# Patient Record
Sex: FEMALE | Race: WHITE | NOT HISPANIC OR LATINO | Employment: OTHER | URBAN - METROPOLITAN AREA
[De-identification: names, ages, dates, MRNs, and addresses within clinical notes are randomized per-mention and may not be internally consistent; named-entity substitution may affect disease eponyms.]

---

## 2017-01-04 ENCOUNTER — ANTICOAGULATION MONITORING (OUTPATIENT)
Dept: VASCULAR LAB | Facility: MEDICAL CENTER | Age: 62
End: 2017-01-04
Attending: NURSE PRACTITIONER
Payer: MEDICARE

## 2017-01-04 VITALS
DIASTOLIC BLOOD PRESSURE: 54 MMHG | BODY MASS INDEX: 22.67 KG/M2 | HEART RATE: 66 BPM | SYSTOLIC BLOOD PRESSURE: 97 MMHG | WEIGHT: 136.24 LBS

## 2017-01-04 DIAGNOSIS — I26.99 PULMONARY EMBOLISM, OTHER: ICD-10-CM

## 2017-01-04 LAB
INR BLD: 1.9 (ref 0.9–1.2)
INR PPP: 1.9 (ref 2–3.5)

## 2017-01-04 PROCEDURE — 99212 OFFICE O/P EST SF 10 MIN: CPT | Performed by: NURSE PRACTITIONER

## 2017-01-04 PROCEDURE — 85610 PROTHROMBIN TIME: CPT

## 2017-01-04 NOTE — MR AVS SNAPSHOT
Annamaria Jorgensen   2017 1:00 PM   Anticoagulation Monitoring   MRN: [id number]    Department:  Vascular Medicine   Dept Phone:  [phone]    Description:  Female : 1955   Provider:  [facility] EXAM 4           Allergies as of 2017     Allergen Noted Reactions    Erythromycin 2011       Levaquin 2010       Tape 2008       rash      Vital Signs     Smoking Status                   Never Smoker            Basic Information     Date Of Birth Sex Race Ethnicity Preferred Language    1955 Female White Non- English      Your appointments     2017  1:30 PM   Established Patient with [facility] EXAM 4   [facility]  (--)    [address] [facility] [id number] [phone]              Problem List              ICD-10-CM Priority Class Noted - Resolved    Morbid obesity (HCC) E66.01   2012 - Present    Obesity E66.9   2012 - Present    ASTHMA    2012 - Present    Anxiety F41.9   2012 - Present    Vertigo R42   2012 - Present    Pain disorder with psychological component F45.41   2012 - Present    Pulmonary embolism (HCC) I26.99   2015 - Present      Health Maintenance        Date Due Completion Dates    IMM PNEUMOCOCCAL 19-64 (ADULT) MEDIUM RISK SERIES (1 of 1 - PPSV23) 3/6/1972 ---    PAP SMEAR 3/6/1974 ---    COLONOSCOPY 3/6/2003 ---    MAMMOGRAM 2016, 2007, 2007, 2004    IMM DTaP/Tdap/Td Vaccine (2 - Td) 2026            Results     POCT Protime      Component    INR    1.9                        Current Immunizations     Influenza Vaccine Quad Inj (Pf) 10/12/2016    SHINGLES VACCINE 10/12/2016    Tdap Vaccine 2016      Below and/or attached are the medications your provider expects you to take. Review all of your home medications and newly ordered medications with your provider and/or pharmacist. Follow medication instructions as  directed by your provider and/or pharmacist. Please keep your medication list with you and share with your provider. Update the information when medications are discontinued, doses are changed, or new medications (including over-the-counter products) are added; and carry medication information at all times in the event of emergency situations     Allergies:  ERYTHROMYCIN - (reactions not documented)     LEVAQUIN - (reactions not documented)     TAPE - (reactions not documented)               Medications  Valid as of: January 04, 2017 -  1:32 PM    Generic Name Brand Name Tablet Size Instructions for use    BuPROPion HCl (TABLET SR 24 HR) WELLBUTRIN  MG Take 300 mg by mouth every morning.        Cholecalciferol (Tab) vitamin D3 (cholecalciferol) 1000 UNIT Take  by mouth every day.          ClonazePAM (Tab) KLONOPIN 1 MG As needed take at night        Coenzyme Q10 (Cap) CoQ10 100 MG Takes 200mg daily        DiazePAM (Tab) VALIUM 5 MG Take 5 mg by mouth every 6 hours as needed for Anxiety.        Hydrocodone-Acetaminophen (Tab) NORCO 5-325 MG Take 1 Tab by mouth every 6 hours as needed.        Krill Oil (Cap) Krill Oil 300 MG Take  by mouth every day.          TraMADol HCl (Tab) ULTRAM 50 MG 50 mg. Every 6 hours as needed for moderate pain        Warfarin Sodium (Tab) COUMADIN 2 MG Take one to one and one-half (1- 1.5) tablets daily as directed by coumadin clinic        .                 Medicines prescribed today were sent to:     [facility] #[id number] - [place] - [address]    [address] [address]    Phone: [phone] Fax: [phone]    Open 24 Hours?: No    [facility] - [place] - [address] AT [facility] [address]    [address] [address]    Phone: [phone] Fax: [phone]    Open 24 Hours?: No      Medication refill instructions:       If your prescription bottle indicates you have medication refills left, it is not necessary to call your  provider’s office. Please contact your pharmacy and they will refill your medication.    If your prescription bottle indicates you do not have any refills left, you may request refills at any time through one of the following ways: The online Collisionable system (except Urgent Care), by calling your provider’s office, or by asking your pharmacy to contact your provider’s office with a refill request. Medication refills are processed only during regular business hours and may not be available until the next business day. Your provider may request additional information or to have a follow-up visit with you prior to refilling your medication.   *Please Note: Medication refills are assigned a new Rx number when refilled electronically. Your pharmacy may indicate that no refills were authorized even though a new prescription for the same medication is available at the pharmacy. Please request the medicine by name with the pharmacy before contacting your provider for a refill.        Warfarin Dosing Calendar   January 2017 Details    Sun Mon Tue Wed Thu Fri Sat     1               2               3               4   1.9   3 mg   See details      5      3 mg         6      3 mg         7      3 mg           8      2 mg         9      3 mg         10      3 mg         11      2 mg         12      3 mg         13      3 mg         14      3 mg           15      2 mg         16      3 mg         17      3 mg         18      2 mg         19      3 mg         20      3 mg         21      3 mg           22      2 mg         23      3 mg         24      3 mg         25      2 mg         26      3 mg         27      3 mg         28      3 mg           29      2 mg         30      3 mg         31      3 mg              Date Details   01/04 This INR check   INR: 1.9               How to take your warfarin dose     To take:  2 mg Take 1 of the 2 mg tablets.    To take:  3 mg Take 1.5 of the 2 mg tablets.           Warfarin Dosing  Calendar February 2017 Details    Sun Mon Tue Wed Thu Fri Sat        1      2 mg         2      3 mg         3      3 mg         4      3 mg           5      2 mg         6      3 mg         7      3 mg         8      2 mg         9               10               11                 12               13               14               15               16               17               18                 19               20               21               22               23               24               25                 26               27               28                    Date Details   No additional details    Date of next INR:  2/8/2017         How to take your warfarin dose     To take:  2 mg Take 1 of the 2 mg tablets.    To take:  3 mg Take 1.5 of the 2 mg tablets.              Filmmortal Access Code: [email or web address]  Expires: 1/17/2017  8:19 AM    Filmmortal  A secure, online tool to manage your health information     [employer]’s Filmmortal® is a secure, online tool that connects you to your personalized health information from the privacy of your home -- day or night - making it very easy for you to manage your healthcare. Once the activation process is completed, you can even access your medical information using the Filmmortal marcellus, which is available for free in the Apple Marcellus store or Google Play store.     Filmmortal provides the following levels of access (as shown below):   My Chart Features   [facility] Doctor [facility]  Specialists [facility]  Urgent  Care [facility]  Primary Care  Doctor   Email your healthcare team securely and privately 24/7 X X X    Manage appointments: schedule your next appointment; view details of past/upcoming appointments X      Request prescription refills. X      View recent personal medical records, including lab and immunizations X X X X   View health record, including health history, allergies, medications X X X X   Read reports about your outpatient visits, procedures,  consult and ER notes X X X X   See your discharge summary, which is a recap of your hospital and/or ER visit that includes your diagnosis, lab results, and care plan. X X       How to register for [employer]:  1. Go to  [email or web address].  2. Click on the Sign Up Now box, which takes you to the New Member Sign Up page. You will need to provide the following information:  a. Enter your [employer]ks Access Code exactly as it appears at the top of this page. (You will not need to use this code after you’ve completed the sign-up process. If you do not sign up before the expiration date, you must request a new code.)   b. Enter your date of birth.   c. Enter your home email address.   d. Click Submit, and follow the next screen’s instructions.  3. Create a A[employer] Complete Home Servicest ID. This will be your [employer]ic[employer] login ID and cannot be changed, so think of one that is secure and easy to remember.  4. Create a A[employer] Complete [employer]icest password. You can change your password at any time.  5. Enter your Password Reset Question and Answer. This can be used at a later time if you forget your password.   6. Enter your e-mail address. This allows you to receive e-mail notifications when new information is available in [employer]ks.  7. Click Sign Up. You can now view your health information.    For assistance activating your [employer] account, call [phone]

## 2017-01-04 NOTE — PROGRESS NOTES
Anticoagulation Summary as of 1/4/2017     INR goal 2.0-3.0   Selected INR 1.9! (1/4/2017)   Maintenance plan 2 mg (2 mg x 1) on Sun, Wed; 3 mg (2 mg x 1.5) all other days   Weekly total 19 mg   Weekly max dose 20 mg   Plan last modified SHANNAN Garcia (5/4/2016)   Next INR check 2/8/2017   Target end date Indefinite    Indications   Pulmonary embolism (HCC) [I26.99]         Anticoagulation Episode Summary     [facility]    Preferred lab     Send INR reminders to     Comments *she has a non-[facility] PCP fax [phone]*      Anticoagulation Care Providers     Provider Role Specialty Phone number    Aron Patel M.D. Referring Internal Medicine [phone]    [facility] Responsible  [phone]        Patient is subtherapeutic today. She ate more greens this week but will return to her normal diet. Denies any medication changes. No current symptoms of bleeding or thrombosis reported. Last VTE in 2010. Will increase tonight then continue current regimen. Follow up in 5 weeks per pt's preference.    Next Appointment: Wednesday, February 8, 2017 at 1:00 pm.      Linda MISTRY

## 2017-01-27 ENCOUNTER — HOSPITAL ENCOUNTER (OUTPATIENT)
Dept: RADIOLOGY | Facility: MEDICAL CENTER | Age: 62
End: 2017-01-27
Attending: ORTHOPAEDIC SURGERY
Payer: MEDICARE

## 2017-01-27 DIAGNOSIS — M25.511 RIGHT SHOULDER PAIN, UNSPECIFIED CHRONICITY: ICD-10-CM

## 2017-01-27 PROCEDURE — 73221 MRI JOINT UPR EXTREM W/O DYE: CPT | Mod: RT

## 2017-02-01 ENCOUNTER — HOSPITAL ENCOUNTER (OUTPATIENT)
Dept: LAB | Facility: MEDICAL CENTER | Age: 62
End: 2017-02-01
Attending: PHYSICIAN ASSISTANT
Payer: MEDICARE

## 2017-02-01 LAB
CHOLEST SERPL-MCNC: 182 MG/DL (ref 100–199)
EST. AVERAGE GLUCOSE BLD GHB EST-MCNC: 111 MG/DL
HBA1C MFR BLD: 5.5 % (ref 0–5.6)
HDLC SERPL-MCNC: 81 MG/DL
LDLC SERPL CALC-MCNC: 91 MG/DL
TRIGL SERPL-MCNC: 51 MG/DL (ref 0–149)

## 2017-02-01 PROCEDURE — 36415 COLL VENOUS BLD VENIPUNCTURE: CPT

## 2017-02-01 PROCEDURE — 83036 HEMOGLOBIN GLYCOSYLATED A1C: CPT

## 2017-02-01 PROCEDURE — 80061 LIPID PANEL: CPT

## 2017-03-01 ENCOUNTER — ANTICOAGULATION VISIT (OUTPATIENT)
Dept: VASCULAR LAB | Facility: MEDICAL CENTER | Age: 62
End: 2017-03-01
Attending: INTERNAL MEDICINE
Payer: MEDICARE

## 2017-03-01 VITALS — HEART RATE: 76 BPM | SYSTOLIC BLOOD PRESSURE: 109 MMHG | DIASTOLIC BLOOD PRESSURE: 58 MMHG

## 2017-03-01 DIAGNOSIS — I27.82 CHRONIC SEPTIC PULMONARY EMBOLISM WITH ACUTE COR PULMONALE (HCC): ICD-10-CM

## 2017-03-01 DIAGNOSIS — I26.01 CHRONIC SEPTIC PULMONARY EMBOLISM WITH ACUTE COR PULMONALE (HCC): ICD-10-CM

## 2017-03-01 LAB
INR BLD: 2.7 (ref 0.9–1.2)
INR PPP: 2.7 (ref 2–3.5)

## 2017-03-01 PROCEDURE — 85610 PROTHROMBIN TIME: CPT

## 2017-03-01 PROCEDURE — 99211 OFF/OP EST MAY X REQ PHY/QHP: CPT | Performed by: NURSE PRACTITIONER

## 2017-03-01 NOTE — PROGRESS NOTES
Anticoagulation Summary as of 3/1/2017     INR goal 2.0-3.0   Selected INR 2.7 (3/1/2017)   Maintenance plan 2 mg (2 mg x 1) on Sun, Wed; 3 mg (2 mg x 1.5) all other days   Weekly total 19 mg   Weekly max dose 20 mg   Plan last modified SHANNAN Garcia (5/4/2016)   Next INR check 5/3/2017   Target end date Indefinite    Indications   Pulmonary embolism (CMS-HCC) [I26.99]         Anticoagulation Episode Summary     [facility]    Preferred lab     Send INR reminders to     Comments *she has a non-[facility]own PCP fax [phone]*      Anticoagulation Care Providers     Provider Role Specialty Phone number    Aron Patel M.D. Referring Internal Medicine [phone]    [facility] Responsible  [phone]          Patient is therapeutic today. Denies any medication or diet changes. No current symptoms of bleeding or thrombosis reported. Continue current regimen. Follow up in 9 weeks.    Next Appointment: Wednesday, May 3rd at 1:30 pm.    Linda MISTRY

## 2017-03-01 NOTE — MR AVS SNAPSHOT
Annamaria Jorgensen   3/1/2017 1:15 PM   Anticoagulation Visit   MRN: [id number]    Department:  Vascular Medicine   Dept Phone:  [phone]    Description:  Female : 1955   Provider:  [facility] EXAM 4           Allergies as of 3/1/2017     Allergen Noted Reactions    Erythromycin 2011       Levaquin 2010       Tape 2008       rash      You were diagnosed with     Chronic septic pulmonary embolism with acute cor pulmonale (CMS-HCC)   [[id number]]         Vital Signs     Blood Pressure Pulse Smoking Status             109/58 mmHg 76 Never Smoker          Basic Information     Date Of Birth Sex Race Ethnicity Preferred Language    1955 Female White Non- English      Your appointments     Mar 15, 2017  1:10 PM   MA SCRN10 with [facility] MG 3   [facility] ([address])    [address]  [address]  [place] [id number] [phone]           No deodorant, powder, perfume or lotion under the arm or breast area.            May 03, 2017  1:30 PM   Established Patient with [facility] EXAM 4   [facility]  (--)    [address] [facility]  [place] [address]   [phone]              Problem List              ICD-10-CM Priority Class Noted - Resolved    Morbid obesity (CMS-HCC) E66.01   2012 - Present    Obesity E66.9   2012 - Present    ASTHMA    2012 - Present    Anxiety F41.9   2012 - Present    Vertigo R42   2012 - Present    Pain disorder with psychological component F45.41   2012 - Present    Pulmonary embolism (CMS-HCC) I26.99   2015 - Present      Health Maintenance        Date Due Completion Dates    IMM PNEUMOCOCCAL 19-64 (ADULT) MEDIUM RISK SERIES (1  - PPSV23) 3/6/1972 ---    PAP SMEAR 3/6/1974 ---    COLONOSCOPY 3/6/2003 ---    MAMMOGRAM 2016, 2007, 2007, 2004    IMM DTaP/Tdap/Td Vaccine (2 - Td) 2026            Results     POCT  Protime      Component    INR    2.7                        Current Immunizations     Influenza Vaccine Quad Inj (Pf) 10/12/2016    SHINGLES VACCINE 10/12/2016    Tdap Vaccine 5/25/2016      Below and/or attached are the medications your provider expects you to take. Review all of your home medications and newly ordered medications with your provider and/or pharmacist. Follow medication instructions as directed by your provider and/or pharmacist. Please keep your medication list with you and share with your provider. Update the information when medications are discontinued, doses are changed, or new medications (including over-the-counter products) are added; and carry medication information at all times in the event of emergency situations     Allergies:  ERYTHROMYCIN - (reactions not documented)     LEVAQUIN - (reactions not documented)     TAPE - (reactions not documented)               Medications  Valid as of: March 01, 2017 -  1:22 PM    Generic Name Brand Name Tablet Size Instructions for use    BuPROPion HCl (TABLET SR 24 HR) WELLBUTRIN  MG Take 300 mg by mouth every morning.        Cholecalciferol (Tab) vitamin D3 (cholecalciferol) 1000 UNIT Take  by mouth every day.          ClonazePAM (Tab) KLONOPIN 1 MG As needed take at night        Coenzyme Q10 (Cap) CoQ10 100 MG Takes 200mg daily        DiazePAM (Tab) VALIUM 5 MG Take 5 mg by mouth every 6 hours as needed for Anxiety.        Hydrocodone-Acetaminophen (Tab) NORCO 5-325 MG Take 1 Tab by mouth every 6 hours as needed.        Krill Oil (Cap) Krill Oil 300 MG Take  by mouth every day.          TraMADol HCl (Tab) ULTRAM 50 MG 50 mg. Every 6 hours as needed for moderate pain        Warfarin Sodium (Tab) COUMADIN 2 MG Take one to one and one-half (1- 1.5) tablets daily as directed by coumadin clinic        .                 Medicines prescribed today were sent to:     [facility][id number] - [place] - [address]    [address] [place] [address]    Phone:  [phone] Fax: [phone]    Open 24 Hours?: No    [facility] - [place], [place] - [address] AT [facility] [address]    [address] [address]    Phone: [phone] Fax: [phone]    Open 24 Hours?: No      Medication refill instructions:       If your prescription bottle indicates you have medication refills left, it is not necessary to call your provider’s office. Please contact your pharmacy and they will refill your medication.    If your prescription bottle indicates you do not have any refills left, you may request refills at any time through one of the following ways: The online Maternova system (except Urgent Care), by calling your provider’s office, or by asking your pharmacy to contact your provider’s office with a refill request. Medication refills are processed only during regular business hours and may not be available until the next business day. Your provider may request additional information or to have a follow-up visit with you prior to refilling your medication.   *Please Note: Medication refills are assigned a new Rx number when refilled electronically. Your pharmacy may indicate that no refills were authorized even though a new prescription for the same medication is available at the pharmacy. Please request the medicine by name with the pharmacy before contacting your provider for a refill.        Warfarin Dosing Calendar   March 2017 Details    Sun Mon Tue Wed Thu Fri Sat        1   2.7   2 mg   See details      2      3 mg         3      3 mg         4      3 mg           5      2 mg         6      3 mg         7      3 mg         8      2 mg         9      3 mg         10      3 mg         11      3 mg           12      2 mg         13      3 mg         14      3 mg         15      2 mg         16      3 mg         17      3 mg         18      3 mg           19      2 mg         20      3 mg         21      3 mg         22      2 mg          23      3 mg         24      3 mg         25      3 mg           26      2 mg         27      3 mg         28      3 mg         29      2 mg         30      3 mg         31      3 mg           Date Details   03/01 This INR check   INR: 2.7               How to take your warfarin dose     To take:  2 mg Take 1 of the 2 mg tablets.    To take:  3 mg Take 1.5 of the 2 mg tablets.           Warfarin Dosing Calendar   April 2017 Details    Sun Mon Tue Wed Thu Fri Sat           1      3 mg           2      2 mg         3      3 mg         4      3 mg         5      2 mg         6      3 mg         7      3 mg         8      3 mg           9      2 mg         10      3 mg         11      3 mg         12      2 mg         13      3 mg         14      3 mg         15      3 mg           16      2 mg         17      3 mg         18      3 mg         19      2 mg         20      3 mg         21      3 mg         22      3 mg           23      2 mg         24      3 mg         25      3 mg         26      2 mg         27      3 mg         28      3 mg         29      3 mg           30      2 mg                Date Details   No additional details            How to take your warfarin dose     To take:  2 mg Take 1 of the 2 mg tablets.    To take:  3 mg Take 1.5 of the 2 mg tablets.           Warfarin Dosing Calendar   May 2017 Details    Sun Mon Tue Wed Thu Fri Sat      1      3 mg         2      3 mg         3      2 mg         4               5               6                 7               8               9               10               11               12               13                 14               15               16               17               18               19               20                 21               22               23               24               25               26               27                 28               29               30               31                   Date Details   No additional  details    Date of next INR:  5/3/2017         How to take your warfarin dose     To take:  2 mg Take 1 of the 2 mg tablets.    To take:  3 mg Take 1.5 of the 2 mg tablets.              PolicyGenius Access Code: [email or web address]  Expires: 3/13/2017  9:51 AM    PolicyGenius  A secure, online tool to manage your health information     [employer] PolicyGenius® is a secure, online tool that connects you to your personalized health information from the privacy of your home -- day or night - making it very easy for you to manage your healthcare. Once the activation process is completed, you can even access your medical information using the PolicyGenius marcellus, which is available for free in the Apple Marcellus store or Google Play store.     PolicyGenius provides the following levels of access (as shown below):   My Chart Features   [facility] Primary Care Doctor [facility]  Specialists [facility]  Urgent  Care [facility]  Primary Care  Doctor   Email your healthcare team securely and privately 24/7 X X X    Manage appointments: schedule your next appointment; view details of past/upcoming appointments X      Request prescription refills. X      View recent personal medical records, including lab and immunizations X X X X   View health record, including health history, allergies, medications X X X X   Read reports about your outpatient visits, procedures, consult and ER notes X X X X   See your discharge summary, which is a recap of your hospital and/or ER visit that includes your diagnosis, lab results, and care plan. X X       How to register for PolicyGenius:  1. Go to  [email or web address].  2. Click on the Sign Up Now box, which takes you to the New Member Sign Up page. You will need to provide the following information:  a. Enter your PolicyGenius Access Code exactly as it appears at the top of this page. (You will not need to use this code after you’ve completed the sign-up process. If you do not sign up before the expiration date, you must request a new  code.)   b. Enter your date of birth.   c. Enter your home email address.   d. Click Submit, and follow the next screen’s instructions.  3. Create a Self Point ID. This will be your Self Point login ID and cannot be changed, so think of one that is secure and easy to remember.  4. Create a [employer] password. You can change your password at any time.  5. Enter your Password Reset Question and Answer. This can be used at a later time if you forget your password.   6. Enter your e-mail address. This allows you to receive e-mail notifications when new information is available in Self Point.  7. Click Sign Up. You can now view your health information.    For assistance activating your Self Point account, call [phone]

## 2017-04-07 ENCOUNTER — HOSPITAL ENCOUNTER (OUTPATIENT)
Dept: CARDIOLOGY | Facility: MEDICAL CENTER | Age: 62
End: 2017-04-07
Attending: INTERNAL MEDICINE
Payer: MEDICARE

## 2017-04-07 DIAGNOSIS — R01.1 CARDIAC MURMUR: ICD-10-CM

## 2017-04-07 LAB — LV EJECT FRACT  99904: 60

## 2017-04-07 PROCEDURE — 93306 TTE W/DOPPLER COMPLETE: CPT

## 2017-04-07 PROCEDURE — 93306 TTE W/DOPPLER COMPLETE: CPT | Mod: 26 | Performed by: INTERNAL MEDICINE

## 2017-05-05 ENCOUNTER — ANTICOAGULATION VISIT (OUTPATIENT)
Dept: VASCULAR LAB | Facility: MEDICAL CENTER | Age: 62
End: 2017-05-05
Attending: INTERNAL MEDICINE
Payer: MEDICARE

## 2017-05-05 DIAGNOSIS — I26.99 PULMONARY EMBOLISM, OTHER: ICD-10-CM

## 2017-05-05 LAB — INR PPP: 2.1 (ref 2–3.5)

## 2017-05-05 PROCEDURE — 99211 OFF/OP EST MAY X REQ PHY/QHP: CPT | Performed by: NURSE PRACTITIONER

## 2017-05-05 PROCEDURE — 85610 PROTHROMBIN TIME: CPT

## 2017-05-05 NOTE — PROGRESS NOTES
Anticoagulation Summary as of 5/5/2017     INR goal 2.0-3.0   Selected INR 2.1 (5/5/2017)   Maintenance plan 2 mg (2 mg x 1) on Sun, Wed; 3 mg (2 mg x 1.5) all other days   Weekly total 19 mg   Weekly max dose 20 mg   Plan last modified SHANNAN Garcia (5/4/2016)   Next INR check 6/5/2017   Target end date Indefinite    Indications   Pulmonary embolism (CMS-HCC) [I26.99]         Anticoagulation Episode Summary     [facility]    Preferred lab     Send INR reminders to     Comments *she has a non-[facility] PCP fax [phone]*      Anticoagulation Care Providers     Provider Role Specialty Phone number    Aron Patel M.D. Referring Internal Medicine [phone]    [facility] Responsible  [phone]        Patient is therapeutic today. Denies any medication or diet changes. No current symptoms of bleeding or thrombosis reported. Continue current regimen. Follow up in 10 weeks.    Next Appointment: Wednesday, July 12, 2017 at  1:00 pm.    Linda MISTRY

## 2017-05-05 NOTE — MR AVS SNAPSHOT
Annamaria Jorgensen   2017 1:20 PM   Anticoagulation Visit   MRN: [id number]    Department:  Vascular Medicine   Dept Phone:  [phone]    Description:  Female : 1955   Provider:  [facility] EXAM 4           Allergies as of 2017     Allergen Noted Reactions    Erythromycin 2011       Levaquin 2010       Tape 2008       rash      You were diagnosed with     Pulmonary embolism, other   [[id number]]         Vital Signs     Smoking Status                   Never Smoker            Basic Information     Date Of Birth Sex Race Ethnicity Preferred Language    1955 Female White Non- English      Your appointments     May 09, 2017  1:30 PM   MA SCRN10 with [facility] MG 3   [facility]    [address]  [address]  [place] [id number] [phone]           No deodorant, powder, perfume or lotion under the arm or breast area.            2017  1:00 PM   Established Patient with [facility] EXAM 4   [facility]  (--)    [address]  [place] [address]   [phone]              Problem List              ICD-10-CM Priority Class Noted - Resolved    Morbid obesity (CMS-[facility]) E66.01   2012 - Present    Obesity E66.9   2012 - Present    ASTHMA    2012 - Present    Anxiety F41.9   2012 - Present    Vertigo R42   2012 - Present    Pain disorder with psychological component F45.41   2012 - Present    Pulmonary embolism (CMS-HCC) I26.99   2015 - Present      Health Maintenance        Date Due Completion Dates    IMM PNEUMOCOCCAL 19-64 (ADULT) MEDIUM RISK SERIES (1 of  - PPSV23) 3/6/1972 ---    PAP SMEAR 3/6/1974 ---    COLONOSCOPY 3/6/2003 ---    MAMMOGRAM 2016, 2007, 2007, 2004    IMM DTaP/Tdap/Td Vaccine (2 - Td) 2026            Results     POCT Protime      Component    INR    2.1                        Current  Immunizations     Influenza Vaccine Quad Inj (Pf) 10/12/2016    SHINGLES VACCINE 10/12/2016    Tdap Vaccine 5/25/2016      Below and/or attached are the medications your provider expects you to take. Review all of your home medications and newly ordered medications with your provider and/or pharmacist. Follow medication instructions as directed by your provider and/or pharmacist. Please keep your medication list with you and share with your provider. Update the information when medications are discontinued, doses are changed, or new medications (including over-the-counter products) are added; and carry medication information at all times in the event of emergency situations     Allergies:  ERYTHROMYCIN - (reactions not documented)     LEVAQUIN - (reactions not documented)     TAPE - (reactions not documented)               Medications  Valid as of: May 05, 2017 -  1:29 PM    Generic Name Brand Name Tablet Size Instructions for use    BuPROPion HCl (TABLET SR 24 HR) WELLBUTRIN  MG Take 300 mg by mouth every morning.        Cholecalciferol (Tab) vitamin D3 (cholecalciferol) 1000 UNIT Take  by mouth every day.          ClonazePAM (Tab) KLONOPIN 1 MG As needed take at night        Coenzyme Q10 (Cap) CoQ10 100 MG Takes 200mg daily        DiazePAM (Tab) VALIUM 5 MG Take 5 mg by mouth every 6 hours as needed for Anxiety.        Hydrocodone-Acetaminophen (Tab) NORCO 5-325 MG Take 1 Tab by mouth every 6 hours as needed.        Krill Oil (Cap) Krill Oil 300 MG Take  by mouth every day.          TraMADol HCl (Tab) ULTRAM 50 MG 50 mg. Every 6 hours as needed for moderate pain        Warfarin Sodium (Tab) COUMADIN 2 MG Take one to one and one-half (1- 1.5) tablets daily as directed by coumadin clinic        .                 Medicines prescribed today were sent to:     [id number] - [place] - [address] [place] [address]    Phone: [phone] Fax: [phone]    Open 24 Hours?: No    [facility] - [place], [place] - [address] AT [facility] [address]    [address] [place] [address]    Phone: [phone] Fax: [phone]    Open 24 Hours?: No      Medication refill instructions:       If your prescription bottle indicates you have medication refills left, it is not necessary to call your provider’s office. Please contact your pharmacy and they will refill your medication.    If your prescription bottle indicates you do not have any refills left, you may request refills at any time through one of the following ways: The online [employer] system (except Urgent Care), by calling your provider’s office, or by asking your pharmacy to contact your provider’s office with a refill request. Medication refills are processed only during regular business hours and may not be available until the next business day. Your provider may request additional information or to have a follow-up visit with you prior to refilling your medication.   *Please Note: Medication refills are assigned a new Rx number when refilled electronically. Your pharmacy may indicate that no refills were authorized even though a new prescription for the same medication is available at the pharmacy. Please request the medicine by name with the pharmacy before contacting your provider for a refill.        Warfarin Dosing Calendar   May 2017 Details    Sun Mon Tue Wed Thu Fri Sat      1               2               3               4               5   2.1   3 mg   See details      6      3 mg           7      2 mg         8      3 mg         9      3 mg         10      2 mg         11      3 mg         12      3 mg         13      3 mg           14      2 mg         15      3 mg         16      3 mg         17      2 mg         18      3 mg         19      3 mg         20      3 mg           21      2 mg         22      3 mg         23      3 mg         24      2 mg         25      3 mg         26      3 mg         27         3 mg           28      2 mg         29      3 mg         30      3 mg         31      2 mg             Date Details   05/05 This INR check   INR: 2.1               How to take your warfarin dose     To take:  2 mg Take 1 of the 2 mg tablets.    To take:  3 mg Take 1.5 of the 2 mg tablets.           Warfarin Dosing Calendar   June 2017 Details    Sun Mon Tue Wed Thu Fri Sat         1      3 mg         2      3 mg         3      3 mg           4      2 mg         5      3 mg         6      3 mg         7      2 mg         8      3 mg         9      3 mg         10      3 mg           11      2 mg         12      3 mg         13      3 mg         14      2 mg         15      3 mg         16      3 mg         17      3 mg           18      2 mg         19      3 mg         20      3 mg         21      2 mg         22      3 mg         23      3 mg         24      3 mg           25      2 mg         26      3 mg         27      3 mg         28      2 mg         29      3 mg         30      3 mg           Date Details   No additional details            How to take your warfarin dose     To take:  2 mg Take 1 of the 2 mg tablets.    To take:  3 mg Take 1.5 of the 2 mg tablets.           Warfarin Dosing Calendar   July 2017 Details    Sun Mon Tue Wed Thu Fri Sat           1      3 mg           2      2 mg         3      3 mg         4      3 mg         5      2 mg         6      3 mg         7      3 mg         8      3 mg           9      2 mg         10      3 mg         11      3 mg         12      2 mg         13               14               15                 16               17               18               19               20               21               22                 23               24               25               26               27               28               29                 30               31                     Date Details   No additional details    Date of next INR:  7/12/2017         How  to take your warfarin dose     To take:  2 mg Take 1 of the 2 mg tablets.    To take:  3 mg Take 1.5 of the 2 mg tablets.              THUBIT Access Code: [email or web address]  Expires: 5/10/2017  1:10 PM    THUBIT  A secure, online tool to manage your health information     [employer]’s THUBIT® is a secure, online tool that connects you to your personalized health information from the privacy of your home -- day or night - making it very easy for you to manage your healthcare. Once the activation process is completed, you can even access your medical information using the THUBIT marcellus, which is available for free in the [employer] Mar[employer] store or Google Play store.     THUBIT provides the following levels of access (as shown below):   My Chart Features   [facility] Doctor [facility]  Urgent  Care Non-[facility]  Primary Care  Doctor   Email your healthcare team securely and privately 24/7 X X X    Manage appointments: schedule your next appointment; view details of past/upcoming appointments X      Request prescription refills. X      View recent personal medical records, including lab and immunizations X X X X   View health record, including health history, allergies, medications X X X X   Read reports about your outpatient visits, procedures, consult and ER notes X X X X   See your discharge summary, which is a recap of your hospital and/or ER visit that includes your diagnosis, lab results, and care plan. X X       How to register for THUBIT:  1. Go to  [email or web address].  2. Click on the Sign Up Now box, which takes you to the New Member Sign Up page. You will need to provide the following information:  a. Enter your THUBIT Access Code exactly as it appears at the top of this page. (You will not need to use this code after you’ve completed the sign-up process. If you do not sign up before the expiration date, you must request a new code.)   b. Enter your date of birth.   c. Enter your  home email address.   d. Click Submit, and follow the next screen’s instructions.  3. Create a [employer] ID. This will be your [employer] login ID and cannot be changed, so think of one that is secure and easy to remember.  4. Create a [employer] password. You can change your password at any time.  5. Enter your Password Reset Question and Answer. This can be used at a later time if you forget your password.   6. Enter your e-mail address. This allows you to receive e-mail notifications when new information is available in [employer].  7. Click Sign Up. You can now view your health information.    For assistance activating your [employer] account, call [phone]

## 2017-05-12 LAB — INR BLD: 2.1 (ref 0.9–1.2)

## 2017-05-25 ENCOUNTER — HOSPITAL ENCOUNTER (OUTPATIENT)
Dept: LAB | Facility: MEDICAL CENTER | Age: 62
End: 2017-05-25
Attending: NURSE PRACTITIONER
Payer: MEDICARE

## 2017-05-25 LAB
25(OH)D3 SERPL-MCNC: 41 NG/ML (ref 30–100)
ALBUMIN SERPL BCP-MCNC: 4.5 G/DL (ref 3.2–4.9)
ALBUMIN/GLOB SERPL: 2 G/DL
ALP SERPL-CCNC: 85 U/L (ref 30–99)
ALT SERPL-CCNC: 51 U/L (ref 2–50)
ANION GAP SERPL CALC-SCNC: 4 MMOL/L (ref 0–11.9)
APPEARANCE UR: CLEAR
AST SERPL-CCNC: 42 U/L (ref 12–45)
BASOPHILS # BLD AUTO: 0.9 % (ref 0–1.8)
BASOPHILS # BLD: 0.04 K/UL (ref 0–0.12)
BILIRUB SERPL-MCNC: 0.5 MG/DL (ref 0.1–1.5)
BILIRUB UR QL STRIP.AUTO: NEGATIVE
BUN SERPL-MCNC: 10 MG/DL (ref 8–22)
CALCIUM SERPL-MCNC: 9.9 MG/DL (ref 8.5–10.5)
CHLORIDE SERPL-SCNC: 107 MMOL/L (ref 96–112)
CHOLEST SERPL-MCNC: 178 MG/DL (ref 100–199)
CO2 SERPL-SCNC: 31 MMOL/L (ref 20–33)
COLOR UR: COLORLESS
CREAT SERPL-MCNC: 0.77 MG/DL (ref 0.5–1.4)
CREAT UR-MCNC: 16.1 MG/DL
CULTURE IF INDICATED INDCX: NO UA CULTURE
EOSINOPHIL # BLD AUTO: 0.07 K/UL (ref 0–0.51)
EOSINOPHIL NFR BLD: 1.5 % (ref 0–6.9)
ERYTHROCYTE [DISTWIDTH] IN BLOOD BY AUTOMATED COUNT: 45.1 FL (ref 35.9–50)
GFR SERPL CREATININE-BSD FRML MDRD: >60 ML/MIN/1.73 M 2
GLOBULIN SER CALC-MCNC: 2.3 G/DL (ref 1.9–3.5)
GLUCOSE SERPL-MCNC: 83 MG/DL (ref 65–99)
GLUCOSE UR STRIP.AUTO-MCNC: NEGATIVE MG/DL
HCT VFR BLD AUTO: 41 % (ref 37–47)
HDLC SERPL-MCNC: 75 MG/DL
HGB BLD-MCNC: 12.9 G/DL (ref 12–16)
IMM GRANULOCYTES # BLD AUTO: 0.01 K/UL (ref 0–0.11)
IMM GRANULOCYTES NFR BLD AUTO: 0.2 % (ref 0–0.9)
KETONES UR STRIP.AUTO-MCNC: NEGATIVE MG/DL
LDLC SERPL CALC-MCNC: 94 MG/DL
LEUKOCYTE ESTERASE UR QL STRIP.AUTO: NEGATIVE
LYMPHOCYTES # BLD AUTO: 2.05 K/UL (ref 1–4.8)
LYMPHOCYTES NFR BLD: 44.3 % (ref 22–41)
MCH RBC QN AUTO: 29.5 PG (ref 27–33)
MCHC RBC AUTO-ENTMCNC: 31.5 G/DL (ref 33.6–35)
MCV RBC AUTO: 93.8 FL (ref 81.4–97.8)
MICRO URNS: NORMAL
MICROALBUMIN UR-MCNC: <0.7 MG/DL
MICROALBUMIN/CREAT UR: NORMAL MG/G (ref 0–30)
MONOCYTES # BLD AUTO: 0.44 K/UL (ref 0–0.85)
MONOCYTES NFR BLD AUTO: 9.5 % (ref 0–13.4)
NEUTROPHILS # BLD AUTO: 2.02 K/UL (ref 2–7.15)
NEUTROPHILS NFR BLD: 43.6 % (ref 44–72)
NITRITE UR QL STRIP.AUTO: NEGATIVE
NRBC # BLD AUTO: 0 K/UL
NRBC BLD AUTO-RTO: 0 /100 WBC
PH UR STRIP.AUTO: 7 [PH]
PLATELET # BLD AUTO: 245 K/UL (ref 164–446)
PMV BLD AUTO: 10.7 FL (ref 9–12.9)
POTASSIUM SERPL-SCNC: 4.5 MMOL/L (ref 3.6–5.5)
PROT SERPL-MCNC: 6.8 G/DL (ref 6–8.2)
PROT UR QL STRIP: NEGATIVE MG/DL
RBC # BLD AUTO: 4.37 M/UL (ref 4.2–5.4)
RBC UR QL AUTO: NEGATIVE
SODIUM SERPL-SCNC: 142 MMOL/L (ref 135–145)
SP GR UR STRIP.AUTO: 1
TRIGL SERPL-MCNC: 45 MG/DL (ref 0–149)
TSH SERPL DL<=0.005 MIU/L-ACNC: 2.25 UIU/ML (ref 0.3–3.7)
WBC # BLD AUTO: 4.6 K/UL (ref 4.8–10.8)

## 2017-05-25 PROCEDURE — 82306 VITAMIN D 25 HYDROXY: CPT

## 2017-05-25 PROCEDURE — 80053 COMPREHEN METABOLIC PANEL: CPT

## 2017-05-25 PROCEDURE — 82570 ASSAY OF URINE CREATININE: CPT

## 2017-05-25 PROCEDURE — 81003 URINALYSIS AUTO W/O SCOPE: CPT

## 2017-05-25 PROCEDURE — 80061 LIPID PANEL: CPT

## 2017-05-25 PROCEDURE — 82043 UR ALBUMIN QUANTITATIVE: CPT

## 2017-05-25 PROCEDURE — 36415 COLL VENOUS BLD VENIPUNCTURE: CPT

## 2017-05-25 PROCEDURE — 85025 COMPLETE CBC W/AUTO DIFF WBC: CPT

## 2017-05-25 PROCEDURE — 84443 ASSAY THYROID STIM HORMONE: CPT

## 2017-05-26 ENCOUNTER — APPOINTMENT (OUTPATIENT)
Dept: RADIOLOGY | Facility: MEDICAL CENTER | Age: 62
End: 2017-05-26
Attending: INTERNAL MEDICINE
Payer: MEDICARE

## 2017-07-11 DIAGNOSIS — Z86.711 HISTORY OF PULMONARY EMBOLISM: ICD-10-CM

## 2017-07-11 DIAGNOSIS — I26.99 PULMONARY EMBOLISM, OTHER: ICD-10-CM

## 2017-07-11 RX ORDER — WARFARIN SODIUM 2 MG/1
TABLET ORAL
Qty: 135 TAB | Refills: 1 | Status: SHIPPED | OUTPATIENT
Start: 2017-07-11 | End: 2017-07-11 | Stop reason: CLARIF

## 2017-07-11 RX ORDER — WARFARIN SODIUM 2 MG/1
TABLET ORAL
Qty: 135 TAB | Refills: 1 | Status: SHIPPED | OUTPATIENT
Start: 2017-07-11 | End: 2018-01-04

## 2017-08-03 ENCOUNTER — ANTICOAGULATION VISIT (OUTPATIENT)
Dept: VASCULAR LAB | Facility: MEDICAL CENTER | Age: 62
End: 2017-08-03
Attending: INTERNAL MEDICINE
Payer: MEDICARE

## 2017-08-03 VITALS — DIASTOLIC BLOOD PRESSURE: 57 MMHG | HEART RATE: 85 BPM | SYSTOLIC BLOOD PRESSURE: 113 MMHG

## 2017-08-03 DIAGNOSIS — I26.99 PULMONARY EMBOLISM, OTHER: ICD-10-CM

## 2017-08-03 LAB
INR BLD: 2 (ref 0.9–1.2)
INR PPP: 2 (ref 2–3.5)

## 2017-08-03 PROCEDURE — 99211 OFF/OP EST MAY X REQ PHY/QHP: CPT | Performed by: PHARMACIST

## 2017-08-03 PROCEDURE — 85610 PROTHROMBIN TIME: CPT

## 2017-08-03 NOTE — PROGRESS NOTES
Anticoagulation Summary as of 8/3/2017     INR goal 2.0-3.0   Selected INR 2.0 (8/3/2017)   Maintenance plan 2 mg (2 mg x 1) on Sun, Wed; 3 mg (2 mg x 1.5) all other days   Weekly total 19 mg   Weekly max dose 20 mg   Plan last modified SHANNAN Garcia (5/4/2016)   Next INR check 11/2/2017   Target end date Indefinite    Indications   Pulmonary embolism (CMS-HCC) [I26.99]         Anticoagulation Episode Summary     INR check location [facility]    Preferred lab     Send INR reminders to     Comments *she has a non-[facility] PCP fax [phone]*      Anticoagulation Care Providers     Provider Role Specialty Phone number    Aron Patel M.D. Referring Internal Medicine [phone]    [facility] Responsible  [phone]        Anticoagulation Patient Findings   Negatives Missed Doses, Extra Doses, Medication Changes, Antibiotic Use, Diet Changes, Dental/Other Procedures, Hospitalization, Bleeding Gums, Nose Bleeds, Blood in Urine, Blood in Stool, Any Bruising, Other Complaints          Current Outpatient Prescriptions on File Prior to Visit   Medication Sig Dispense Refill   • warfarin (COUMADIN) 2 MG Tab Take one to one & one-half (1- 1.5) tablets by mouth daily as directed by the coumadin clinic 135 Tab 1   • diazepam (VALIUM) 5 MG Tab Take 5 mg by mouth every 6 hours as needed for Anxiety.     • hydrocodone-acetaminophen (NORCO) 5-325 MG Tab per tablet Take 1 Tab by mouth every 6 hours as needed. 15 Tab 0   • buPROPion (WELLBUTRIN XL) 300 MG XL tablet Take 300 mg by mouth every morning.     • Cholecalciferol (VITAMIN D3) 1000 UNIT TABS Take  by mouth every day.       • Krill Oil 300 MG CAPS Take  by mouth every day.       • TRAMADOL HCL 50 MG PO TABS 50 mg. Every 6 hours as needed for moderate pain     • CLONAZEPAM 1 MG PO TABS As needed take at night     • COQ10 100 MG PO CAPS Takes 200mg daily       No current facility-administered medications on file prior to visit.       Lab  Results   Component Value Date/Time    SODIUM 142 05/25/2017 12:44 PM    POTASSIUM 4.5 05/25/2017 12:44 PM    CHLORIDE 107 05/25/2017 12:44 PM    CO2 31 05/25/2017 12:44 PM    GLUCOSE 83 05/25/2017 12:44 PM    BUN 10 05/25/2017 12:44 PM    CREATININE 0.77 05/25/2017 12:44 PM          Annamaria Jorgensen seen in clinic today  INR  is-therapeutic.    Denies signs/symptoms of bleeding and/or thrombosis.    Denies changes to diet or medications.   Follow up appointment in 12 week(s).      Will continue same warfarin dosing.      Evelina Gutierrez, PHARMD

## 2017-08-03 NOTE — MR AVS SNAPSHOT
Annamaria Jorgensen   8/3/2017 1:45 PM   Anticoagulation Visit   MRN: [id number]    Department:  Vascular Medicine   Dept Phone:  [phone]    Description:  Female : 1955   Provider:  [facility] EXAM 4           Allergies as of 8/3/2017     Allergen Noted Reactions    Erythromycin 2011       Levaquin 2010       Tape 2008       rash      You were diagnosed with     Pulmonary embolism, other   [[id number]]         Vital Signs     Blood Pressure Pulse Smoking Status             113/57 mmHg 85 Never Smoker          Basic Information     Date Of Birth Sex Race Ethnicity Preferred Language    1955 Female White Non- English      Your appointments     2017  1:30 PM   Established Patient with [facility] EXAM 4   [facility]  (--)    [facility] [facility] [address]   [phone]              Problem List              ICD-10-CM Priority Class Noted - Resolved    Morbid obesity (CMS-HCC) E66.01   2012 - Present    Obesity E66.9   2012 - Present    ASTHMA    2012 - Present    Anxiety F41.9   2012 - Present    Vertigo R42   2012 - Present    Pain disorder with psychological component F45.41   2012 - Present    Pulmonary embolism (CMS-HCC) I26.99   2015 - Present      Health Maintenance        Date Due Completion Dates    IMM PNEUMOCOCCAL 19-64 (ADULT) MEDIUM RISK SERIES (1 of 1 - PPSV23) 3/6/1972 ---    PAP SMEAR 3/6/1974 ---    COLONOSCOPY 3/6/2003 ---    MAMMOGRAM 2016, 2007, 2007, 2004    IMM INFLUENZA (1) 2017 10/12/2016    IMM DTaP/Tdap/Td Vaccine (2 - Td) 2026            Results     POCT Protime      Component    INR    2.0                        Current Immunizations     Influenza Vaccine Quad Inj (Pf) 10/12/2016    SHINGLES VACCINE 10/12/2016    Tdap Vaccine 2016      Below and/or attached are the medications your provider expects you to  take. Review all of your home medications and newly ordered medications with your provider and/or pharmacist. Follow medication instructions as directed by your provider and/or pharmacist. Please keep your medication list with you and share with your provider. Update the information when medications are discontinued, doses are changed, or new medications (including over-the-counter products) are added; and carry medication information at all times in the event of emergency situations     Allergies:  ERYTHROMYCIN - (reactions not documented)     LEVAQUIN - (reactions not documented)     TAPE - (reactions not documented)               Medications  Valid as of: August 03, 2017 -  1:51 PM    Generic Name Brand Name Tablet Size Instructions for use    BuPROPion HCl (TABLET SR 24 HR) WELLBUTRIN  MG Take 300 mg by mouth every morning.        Cholecalciferol (Tab) vitamin D3 (cholecalciferol) 1000 UNIT Take  by mouth every day.          ClonazePAM (Tab) KLONOPIN 1 MG As needed take at night        Coenzyme Q10 (Cap) CoQ10 100 MG Takes 200mg daily        DiazePAM (Tab) VALIUM 5 MG Take 5 mg by mouth every 6 hours as needed for Anxiety.        Hydrocodone-Acetaminophen (Tab) NORCO 5-325 MG Take 1 Tab by mouth every 6 hours as needed.        Krill Oil (Cap) Krill Oil 300 MG Take  by mouth every day.          TraMADol HCl (Tab) ULTRAM 50 MG 50 mg. Every 6 hours as needed for moderate pain        Warfarin Sodium (Tab) COUMADIN 2 MG Take one to one & one-half (1- 1.5) tablets by mouth daily as directed by the coumadin clinic        .                 Medicines prescribed today were sent to:     [id number] - [place] - [address] [place] [address]    Phone: [phone] Fax: [phone]    Open 24 Hours?: No    [facility] - [place] - [address]    [address] [place] [address]    Phone: [phone] Fax: [phone]    Open 24 Hours?:  No    [id number] - [place], [place] - [address]    [address] [place] [address]    Phone: [phone] Fax: [phone]    Open 24 Hours?: No      Medication refill instructions:       If your prescription bottle indicates you have medication refills left, it is not necessary to call your provider’s office. Please contact your pharmacy and they will refill your medication.    If your prescription bottle indicates you do not have any refills left, you may request refills at any time through one of the following ways: The online [employer] system (except Urgent Care), by calling your provider’s office, or by asking your pharmacy to contact your provider’s office with a refill request. Medication refills are processed only during regular business hours and may not be available until the next business day. Your provider may request additional information or to have a follow-up visit with you prior to refilling your medication.   *Please Note: Medication refills are assigned a new Rx number when refilled electronically. Your pharmacy may indicate that no refills were authorized even though a new prescription for the same medication is available at the pharmacy. Please request the medicine by name with the pharmacy before contacting your provider for a refill.        Warfarin Dosing Calendar   August 2017 Details    Sun Mon Tue Wed Thu Fri Sat       1               2               3   2.0   3 mg   See details      4      3 mg         5      3 mg           6      2 mg         7      3 mg         8      3 mg         9      2 mg         10      3 mg         11      3 mg         12      3 mg           13      2 mg         14      3 mg         15      3 mg         16      2 mg         17      3 mg         18      3 mg         19      3 mg           20      2 mg         21      3 mg         22      3 mg         23      2 mg         24      3 mg         25      3 mg         26      3 mg              27      2 mg         28      3 mg         29      3 mg         30      2 mg         31      3 mg            Date Details   08/03 This INR check   INR: 2.0               How to take your warfarin dose     To take:  2 mg Take 1 of the 2 mg tablets.    To take:  3 mg Take 1.5 of the 2 mg tablets.           Warfarin Dosing Calendar   September 2017 Details    Sun Mon Tue Wed Thu Fri Sat          1      3 mg         2      3 mg           3      2 mg         4      3 mg         5      3 mg         6      2 mg         7      3 mg         8      3 mg         9      3 mg           10      2 mg         11      3 mg         12      3 mg         13      2 mg         14      3 mg         15      3 mg         16      3 mg           17      2 mg         18      3 mg         19      3 mg         20      2 mg         21      3 mg         22      3 mg         23      3 mg           24      2 mg         25      3 mg         26      3 mg         27      2 mg         28      3 mg         29      3 mg         30      3 mg          Date Details   No additional details            How to take your warfarin dose     To take:  2 mg Take 1 of the 2 mg tablets.    To take:  3 mg Take 1.5 of the 2 mg tablets.           Warfarin Dosing Calendar   October 2017 Details    Sun Mon Tue Wed Thu Fri Sat     1      2 mg         2      3 mg         3      3 mg         4      2 mg         5      3 mg         6      3 mg         7      3 mg           8      2 mg         9      3 mg         10      3 mg         11      2 mg         12      3 mg         13      3 mg         14      3 mg           15      2 mg         16      3 mg         17      3 mg         18      2 mg         19      3 mg         20      3 mg         21      3 mg           22      2 mg         23      3 mg         24      3 mg         25      2 mg         26      3 mg         27      3 mg         28      3 mg           29      2 mg         30      3 mg         31      3 mg               Date Details   No additional details            How to take your warfarin dose     To take:  2 mg Take 1 of the 2 mg tablets.    To take:  3 mg Take 1.5 of the 2 mg tablets.           Warfarin Dosing Calendar   November 2017 Details    Sun Mon Tue Wed Thu Fri Sat        1      2 mg         2      3 mg         3               4                 5               6               7               8               9               10               11                 12               13               14               15               16               17               18                 19               20               21               22               23               24               25                 26               27               28               29               30                  Date Details   No additional details    Date of next INR:  11/2/2017         How to take your warfarin dose     To take:  2 mg Take 1 of the 2 mg tablets.    To take:  3 mg Take 1.5 of the 2 mg tablets.              [employer] Access Code: [email or web address]  Expires: 9/2/2017  1:51 PM    [employer]  A secure, online tool to manage your health information     [employer]’s [employer]® is a secure, online tool that connects you to your personalized health information from the privacy of your home -- day or night - making it very easy for you to manage your healthcare. Once the activation process is completed, you can even access your medical information using the [employer] marcellus, which is available for free in the Apple Marcellus store or Google Play store.     [employer] provides the following levels of access (as shown below):   My Chart Features   [facility] Doctor [facility]  Specialists [facility]  Urgent  Care [facility]  Primary Care  Doctor   Email your healthcare team securely and privately 24/7 X X X    Manage appointments: schedule your next appointment; view details of past/upcoming appointments X      Request prescription refills. X      View  recent personal medical records, including lab and immunizations X X X X   View health record, including health history, allergies, medications X X X X   Read reports about your outpatient visits, procedures, consult and ER notes X X X X   See your discharge summary, which is a recap of your hospital and/or ER visit that includes your diagnosis, lab results, and care plan. X X       How to register for [employer]:  1. Go to  [email or web address].  2. Click on the Sign Up Now box, which takes you to the New Member Sign Up page. You will need to provide the following information:  a. Enter your [employer] Access Code exactly as it appears at the top of this page. (You will not need to use this code after you’ve completed the sign-up process. If you do not sign up before the expiration date, you must request a new code.)   b. Enter your date of birth.   c. Enter your home email address.   d. Click Submit, and follow the next screen’s instructions.  3. Create a [employer] ID. This will be your [employer] login ID and cannot be changed, so think of one that is secure and easy to remember.  4. Create a [employer] password. You can change your password at any time.  5. Enter your Password Reset Question and Answer. This can be used at a later time if you forget your password.   6. Enter your e-mail address. This allows you to receive e-mail notifications when new information is available in [employer].  7. Click Sign Up. You can now view your health information.    For assistance activating your [employer] account, call [phone]

## 2017-11-09 ENCOUNTER — TELEPHONE (OUTPATIENT)
Dept: VASCULAR LAB | Facility: MEDICAL CENTER | Age: 62
End: 2017-11-09

## 2017-11-16 DIAGNOSIS — I26.99 OTHER PULMONARY EMBOLISM WITHOUT ACUTE COR PULMONALE, UNSPECIFIED CHRONICITY (HCC): ICD-10-CM

## 2017-11-17 ENCOUNTER — ANTICOAGULATION VISIT (OUTPATIENT)
Dept: VASCULAR LAB | Facility: MEDICAL CENTER | Age: 62
End: 2017-11-17
Attending: INTERNAL MEDICINE
Payer: MEDICARE

## 2017-11-17 VITALS — HEART RATE: 74 BPM | SYSTOLIC BLOOD PRESSURE: 111 MMHG | DIASTOLIC BLOOD PRESSURE: 70 MMHG | HEIGHT: 65 IN

## 2017-11-17 DIAGNOSIS — Z86.711 HISTORY OF PULMONARY EMBOLUS (PE): ICD-10-CM

## 2017-11-17 LAB
INR BLD: 2.4 (ref 0.9–1.2)
INR PPP: 2.4 (ref 2–3.5)

## 2017-11-17 PROCEDURE — 85610 PROTHROMBIN TIME: CPT

## 2017-11-17 PROCEDURE — 99211 OFF/OP EST MAY X REQ PHY/QHP: CPT | Performed by: PHARMACIST

## 2017-11-17 NOTE — PROGRESS NOTES
Anticoagulation Summary  As of 11/17/2017    INR goal:   2.0-3.0   TTR:   75.4 % (2.4 y)   Today's INR:   2.4   Maintenance plan:   2 mg (2 mg x 1) on Sun, Wed; 3 mg (2 mg x 1.5) all other days   Weekly total:   19 mg   Weekly max dose:   20 mg   Plan last modified:   SHANNAN Garcia (5/4/2016)   Next INR check:   1/10/2018   Target end date:   Indefinite    Indications    Pulmonary embolism (CMS-HCC) [I26.99]             Anticoagulation Episode Summary     [facility]    Preferred lab:       Send INR reminders to:       Comments:   *she has a non-[facility]own PCP fax [phone]*      Anticoagulation Care Providers     Provider Role Specialty Phone number    Aron Patel M.D. Referring Internal Medicine [phone]    [facility] Responsible  [phone]        Anticoagulation Patient Findings      HPI:  Annamaria Brennen Jorgensen seen in clinic today, on anticoagulation therapy with warfarin for PE  Changes to current medical/health status since last appt: denies  Denies signs/symptoms of bleeding and/or thrombosis since the last appt.    Denies any interval changes to diet  Denies any interval changes to medications since last appt.   Denies any complications or cost restrictions with current therapy.   BP recorded in vitals.      A/P   INR  is-therapeutic.   Will continue with the same warfarin dosing    Follow up appointment in 8 week(s).    Evelina Gutierrez, PharmD

## 2017-11-29 ENCOUNTER — HOSPITAL ENCOUNTER (OUTPATIENT)
Dept: LAB | Facility: MEDICAL CENTER | Age: 62
End: 2017-11-29
Attending: NURSE PRACTITIONER
Payer: MEDICARE

## 2017-11-29 LAB
ALBUMIN SERPL BCP-MCNC: 3.9 G/DL (ref 3.2–4.9)
ALBUMIN/GLOB SERPL: 1.6 G/DL
ALP SERPL-CCNC: 85 U/L (ref 30–99)
ALT SERPL-CCNC: 21 U/L (ref 2–50)
ANION GAP SERPL CALC-SCNC: 6 MMOL/L (ref 0–11.9)
APPEARANCE UR: CLEAR
AST SERPL-CCNC: 21 U/L (ref 12–45)
BASOPHILS # BLD AUTO: 0.9 % (ref 0–1.8)
BASOPHILS # BLD: 0.04 K/UL (ref 0–0.12)
BILIRUB SERPL-MCNC: 0.6 MG/DL (ref 0.1–1.5)
BILIRUB UR QL STRIP.AUTO: NEGATIVE
BUN SERPL-MCNC: 11 MG/DL (ref 8–22)
CALCIUM SERPL-MCNC: 9.7 MG/DL (ref 8.5–10.5)
CHLORIDE SERPL-SCNC: 106 MMOL/L (ref 96–112)
CO2 SERPL-SCNC: 28 MMOL/L (ref 20–33)
COLOR UR: YELLOW
CREAT SERPL-MCNC: 0.76 MG/DL (ref 0.5–1.4)
CREAT UR-MCNC: 26.9 MG/DL
CULTURE IF INDICATED INDCX: NO UA CULTURE
EOSINOPHIL # BLD AUTO: 0.1 K/UL (ref 0–0.51)
EOSINOPHIL NFR BLD: 2.3 % (ref 0–6.9)
ERYTHROCYTE [DISTWIDTH] IN BLOOD BY AUTOMATED COUNT: 43.8 FL (ref 35.9–50)
FOLATE SERPL-MCNC: >23.4 NG/ML
GFR SERPL CREATININE-BSD FRML MDRD: >60 ML/MIN/1.73 M 2
GLOBULIN SER CALC-MCNC: 2.5 G/DL (ref 1.9–3.5)
GLUCOSE SERPL-MCNC: 64 MG/DL (ref 65–99)
GLUCOSE UR STRIP.AUTO-MCNC: NEGATIVE MG/DL
HCT VFR BLD AUTO: 40.3 % (ref 37–47)
HGB BLD-MCNC: 12.9 G/DL (ref 12–16)
IMM GRANULOCYTES # BLD AUTO: 0.01 K/UL (ref 0–0.11)
IMM GRANULOCYTES NFR BLD AUTO: 0.2 % (ref 0–0.9)
KETONES UR STRIP.AUTO-MCNC: NEGATIVE MG/DL
LEUKOCYTE ESTERASE UR QL STRIP.AUTO: NEGATIVE
LYMPHOCYTES # BLD AUTO: 1.44 K/UL (ref 1–4.8)
LYMPHOCYTES NFR BLD: 33.5 % (ref 22–41)
MCH RBC QN AUTO: 30.1 PG (ref 27–33)
MCHC RBC AUTO-ENTMCNC: 32 G/DL (ref 33.6–35)
MCV RBC AUTO: 93.9 FL (ref 81.4–97.8)
MICRO URNS: NORMAL
MICROALBUMIN UR-MCNC: 1.7 MG/DL
MICROALBUMIN/CREAT UR: 63 MG/G (ref 0–30)
MONOCYTES # BLD AUTO: 0.31 K/UL (ref 0–0.85)
MONOCYTES NFR BLD AUTO: 7.2 % (ref 0–13.4)
NEUTROPHILS # BLD AUTO: 2.4 K/UL (ref 2–7.15)
NEUTROPHILS NFR BLD: 55.9 % (ref 44–72)
NITRITE UR QL STRIP.AUTO: NEGATIVE
NRBC # BLD AUTO: 0 K/UL
NRBC BLD AUTO-RTO: 0 /100 WBC
PH UR STRIP.AUTO: 6.5 [PH]
PLATELET # BLD AUTO: 257 K/UL (ref 164–446)
PMV BLD AUTO: 11.6 FL (ref 9–12.9)
POTASSIUM SERPL-SCNC: 4.3 MMOL/L (ref 3.6–5.5)
PROT SERPL-MCNC: 6.4 G/DL (ref 6–8.2)
PROT UR QL STRIP: NEGATIVE MG/DL
RBC # BLD AUTO: 4.29 M/UL (ref 4.2–5.4)
RBC UR QL AUTO: NEGATIVE
SODIUM SERPL-SCNC: 140 MMOL/L (ref 135–145)
SP GR UR STRIP.AUTO: 1.01
UROBILINOGEN UR STRIP.AUTO-MCNC: 0.2 MG/DL
VIT B12 SERPL-MCNC: 616 PG/ML (ref 211–911)
WBC # BLD AUTO: 4.3 K/UL (ref 4.8–10.8)

## 2017-11-29 PROCEDURE — 85025 COMPLETE CBC W/AUTO DIFF WBC: CPT

## 2017-11-29 PROCEDURE — 80053 COMPREHEN METABOLIC PANEL: CPT

## 2017-11-29 PROCEDURE — 36415 COLL VENOUS BLD VENIPUNCTURE: CPT

## 2017-11-29 PROCEDURE — 81003 URINALYSIS AUTO W/O SCOPE: CPT

## 2017-11-29 PROCEDURE — 82043 UR ALBUMIN QUANTITATIVE: CPT

## 2017-11-29 PROCEDURE — 82570 ASSAY OF URINE CREATININE: CPT

## 2017-11-29 PROCEDURE — 84207 ASSAY OF VITAMIN B-6: CPT

## 2017-11-29 PROCEDURE — 84425 ASSAY OF VITAMIN B-1: CPT

## 2017-11-29 PROCEDURE — 82607 VITAMIN B-12: CPT

## 2017-11-29 PROCEDURE — 82746 ASSAY OF FOLIC ACID SERUM: CPT

## 2017-12-01 ENCOUNTER — PATIENT OUTREACH (OUTPATIENT)
Dept: HEALTH INFORMATION MANAGEMENT | Facility: OTHER | Age: 62
End: 2017-12-01

## 2017-12-01 NOTE — PROGRESS NOTES
Outcome: No Answer    Please transfer to Patient Outreach Team at [phone] when patient returns call.    WebIZ Checked & Epic Updated:  yes    [facility] Verified: yes    Attempt # 1

## 2017-12-02 LAB
VIT B1 BLD-MCNC: 123 NMOL/L (ref 70–180)
VIT B6 SERPL-MCNC: 90.5 NMOL/L (ref 20–125)

## 2017-12-20 NOTE — PROGRESS NOTES
Outcome: Left Message    Please transfer to Patient Outreach Team at [phone] when patient returns call.    WebIZ Checked & Epic Updated:  yes    [facility]Connect Verified: yes    Attempt # 2

## 2018-01-10 ENCOUNTER — ANTICOAGULATION VISIT (OUTPATIENT)
Dept: VASCULAR LAB | Facility: MEDICAL CENTER | Age: 63
End: 2018-01-10
Attending: INTERNAL MEDICINE
Payer: MEDICARE

## 2018-01-10 DIAGNOSIS — Z86.711 HISTORY OF PULMONARY EMBOLUS (PE): ICD-10-CM

## 2018-01-10 LAB
INR BLD: 1.6 (ref 0.9–1.2)
INR PPP: 1.6 (ref 2–3.5)

## 2018-01-10 PROCEDURE — 85610 PROTHROMBIN TIME: CPT

## 2018-01-10 PROCEDURE — 99212 OFFICE O/P EST SF 10 MIN: CPT | Performed by: PHARMACIST

## 2018-02-07 ENCOUNTER — ANTICOAGULATION VISIT (OUTPATIENT)
Dept: VASCULAR LAB | Facility: MEDICAL CENTER | Age: 63
End: 2018-02-07
Attending: INTERNAL MEDICINE
Payer: MEDICARE

## 2018-02-07 VITALS — DIASTOLIC BLOOD PRESSURE: 61 MMHG | HEART RATE: 65 BPM | SYSTOLIC BLOOD PRESSURE: 119 MMHG

## 2018-02-07 DIAGNOSIS — I26.99 OTHER PULMONARY EMBOLISM WITHOUT ACUTE COR PULMONALE, UNSPECIFIED CHRONICITY (HCC): ICD-10-CM

## 2018-02-07 DIAGNOSIS — Z86.711 HISTORY OF PULMONARY EMBOLISM: ICD-10-CM

## 2018-02-07 LAB
INR BLD: 1.8 (ref 0.9–1.2)
INR PPP: 1.8 (ref 2–3.5)

## 2018-02-07 PROCEDURE — 85610 PROTHROMBIN TIME: CPT

## 2018-02-07 PROCEDURE — 99212 OFFICE O/P EST SF 10 MIN: CPT | Performed by: NURSE PRACTITIONER

## 2018-02-07 RX ORDER — WARFARIN SODIUM 2 MG/1
TABLET ORAL
Qty: 135 TAB | Refills: 1 | Status: SHIPPED | OUTPATIENT
Start: 2018-02-07 | End: 2018-09-20 | Stop reason: SDUPTHER

## 2018-02-07 NOTE — PROGRESS NOTES
Anticoagulation Summary  As of 2/7/2018    INR goal:   2.0-3.0   TTR:   71.8 % (2.6 y)   Today's INR:   1.8!   Maintenance plan:   2 mg (2 mg x 1) on Sun; 3 mg (2 mg x 1.5) all other days   Weekly total:   20 mg   Weekly max dose:   20 mg   Plan last modified:   PABLO GarciaPEMMA (2/7/2018)   Next INR check:   3/8/2018   Target end date:   Indefinite    Indications    Pulmonary embolism (CMS-HCC) [I26.99]             Anticoagulation Episode Summary     [facility]   [facility]    Preferred lab:       Send INR reminders to:       Comments:   *she has a non-renown PCP fax [phone]*      Anticoagulation Care Providers     Provider Role Specialty Phone number    Aron Patel M.D. Referring Internal Medicine [phone]    [facility] Responsible  [phone]        Anticoagulation Patient Findings      HPI:  Annamaria Jorgensen seen in clinic today for follow up on anticoagulation therapy in the presence of PE hx. Denies any changes to current medical/health status since last appointment. May be eating more greens. Denies any medication changes. No current symptoms of bleeding or thrombosis reported.    A/P:   INR subtherapeutic. INR trending low. No recent VTEs. Will increase regimen. BP recorded in vitals.    Follow up appointment in 4 week(s) per pt's request.    Next Appointment: Thursday, March 8, 2018 at 1:45 pm.     Linda MISTRY

## 2018-03-20 ENCOUNTER — ANTICOAGULATION VISIT (OUTPATIENT)
Dept: VASCULAR LAB | Facility: MEDICAL CENTER | Age: 63
End: 2018-03-20
Attending: INTERNAL MEDICINE
Payer: MEDICARE

## 2018-03-20 VITALS — HEART RATE: 75 BPM | DIASTOLIC BLOOD PRESSURE: 65 MMHG | SYSTOLIC BLOOD PRESSURE: 107 MMHG

## 2018-03-20 DIAGNOSIS — I26.99 OTHER PULMONARY EMBOLISM WITHOUT ACUTE COR PULMONALE, UNSPECIFIED CHRONICITY (HCC): ICD-10-CM

## 2018-03-20 LAB — INR PPP: 2 (ref 2–3.5)

## 2018-03-20 PROCEDURE — 85610 PROTHROMBIN TIME: CPT

## 2018-03-20 PROCEDURE — 99212 OFFICE O/P EST SF 10 MIN: CPT

## 2018-03-20 NOTE — PROGRESS NOTES
OP Anticoagulation Service Note    Date: 3/20/2018  There were no vitals filed for this visit.    Anticoagulation Summary  As of 3/20/2018    INR goal:   2.0-3.0   TTR:   68.8 % (2.7 y)   Today's INR:   2.0   Maintenance plan:   3 mg (2 mg x 1.5) every day   Weekly total:   21 mg   Weekly max dose:   20 mg   Plan last modified:   Kvng Mclaughlin, PharmD (3/20/2018)   Next INR check:   4/17/2018   Target end date:   Indefinite    Indications    Pulmonary embolism (CMS-HCC) [I26.99]             Anticoagulation Episode Summary     [facility]   [facility]    Preferred lab:       Send INR reminders to:       Comments:   *she has a non-renown PCP fax [phone]*      Anticoagulation Care Providers     Provider Role Specialty Phone number    Aron Patel M.D. Referring Internal Medicine [phone]    [facility] Responsible  [phone]        Anticoagulation Patient Findings      HPI:   Annamaria Jorgensen seen in clinic today, they are here today for a INR check on anticoagulation therapy with warfarin because they have Pulmonary Embolism    The reason for today's visit is to prevent morbidity and mortality from a blood clot  and to reduce the risk of bleeding while on a anticoagulant.     Reason for today's visit (per our collaborative practice policy) is because their last INR was 1.8  on  1/10/2018.   Intervention at the last visit:  none    Additional education provided today regarding reducing bleed risk and dietary constraints:  About diet    Any upcoming procedures:   none    Confirmed warfarin dosing regimen  Interval Changes with foods rich in vitamin K: No  Interval Changes in ETOH:   No  Interval Changes in smoking status:  No  Interval Changes in medication:  No   Cost restriction:  No    S/S of bleeding or bruising:  No  Signs/symptoms  thrombosis since the last appt:  No  Bleed risk is:  moderate,     3 vitals included with today's appt :No  (BP, HR, weight, ht, RR)      Assessment:   INR  therapeutic.     I will increase the dose to target a 2.5    They have a TTR of 68.8  which is not at target (TTR target/goal is 100%) and requires close follow up to prevent a adverse event (the lower the TTR the higher risk of clots, strokes, or bleeding).       Plan:  Increase weekly warfarin dose as noted      Follow up:  Follow up appointment in 4 week(s)       Other info:  Pt educated to contact our clinic with any changes in medications or s/s of bleeding or thrombosis    CHEST guidelines recommend frequent INR monitoring at regular intervals (a few days up to a max of 12 weeks) to ensure they are on the proper dose of warfarin and not having any complications from therapy.  INRs can dramatically change over a short time period due to diet, medications, and medical conditions.

## 2018-04-18 ENCOUNTER — APPOINTMENT (OUTPATIENT)
Dept: VASCULAR LAB | Facility: MEDICAL CENTER | Age: 63
End: 2018-04-18
Attending: INTERNAL MEDICINE
Payer: MEDICARE

## 2018-04-20 ENCOUNTER — ANTICOAGULATION VISIT (OUTPATIENT)
Dept: VASCULAR LAB | Facility: MEDICAL CENTER | Age: 63
End: 2018-04-20
Attending: INTERNAL MEDICINE
Payer: MEDICARE

## 2018-04-20 VITALS — SYSTOLIC BLOOD PRESSURE: 123 MMHG | HEART RATE: 80 BPM | DIASTOLIC BLOOD PRESSURE: 65 MMHG

## 2018-04-20 DIAGNOSIS — I26.99 OTHER PULMONARY EMBOLISM WITHOUT ACUTE COR PULMONALE, UNSPECIFIED CHRONICITY (HCC): ICD-10-CM

## 2018-04-20 LAB
INR BLD: 3 (ref 0.9–1.2)
INR PPP: 3 (ref 2–3.5)

## 2018-04-20 PROCEDURE — 85610 PROTHROMBIN TIME: CPT

## 2018-04-20 PROCEDURE — 99211 OFF/OP EST MAY X REQ PHY/QHP: CPT

## 2018-04-20 NOTE — PROGRESS NOTES
Anticoagulation Summary  As of 4/20/2018    INR goal:   2.0-3.0   TTR:   69.8 % (2.8 y)   Today's INR:   3.0   Maintenance plan:   3 mg (2 mg x 1.5) every day   Weekly total:   21 mg   Weekly max dose:   20 mg   Plan last modified:   Alissa BecerraD (3/20/2018)   Next INR check:   6/1/2018   Target end date:   Indefinite    Indications    Pulmonary embolism (CMS-HCC) [I26.99]             Anticoagulation Episode Summary     [facility]    Preferred lab:       Send INR reminders to:       Comments:   *she has a non-renown PCP fax [phone]*      Anticoagulation Care Providers     Provider Role Specialty Phone number    Aron Patel M.D. Referring Internal Medicine [phone]    [facility] Responsible  [phone]        Anticoagulation Patient Findings    History of Present Illness: follow up appointment for chronic anticoagulation with the high risk medication, warfarin for pulmonary embolism.    Last INR was out of range, dosage adjusted: no    INR therapeutic  No changes in dosing  Follow up INR in 6 weeks  No complaints from patient    Long Kim, AlissaD

## 2018-06-13 ENCOUNTER — ANTICOAGULATION VISIT (OUTPATIENT)
Dept: VASCULAR LAB | Facility: MEDICAL CENTER | Age: 63
End: 2018-06-13
Attending: INTERNAL MEDICINE
Payer: MEDICARE

## 2018-06-13 VITALS — DIASTOLIC BLOOD PRESSURE: 52 MMHG | HEART RATE: 67 BPM | SYSTOLIC BLOOD PRESSURE: 109 MMHG

## 2018-06-13 DIAGNOSIS — I26.99 OTHER PULMONARY EMBOLISM WITHOUT ACUTE COR PULMONALE, UNSPECIFIED CHRONICITY (HCC): ICD-10-CM

## 2018-06-13 LAB
INR BLD: 1.7 (ref 0.9–1.2)
INR PPP: 1.7 (ref 2–3.5)

## 2018-06-13 PROCEDURE — 85610 PROTHROMBIN TIME: CPT

## 2018-06-13 PROCEDURE — 99212 OFFICE O/P EST SF 10 MIN: CPT | Performed by: NURSE PRACTITIONER

## 2018-06-13 NOTE — PROGRESS NOTES
Anticoagulation Summary  As of 6/13/2018    INR goal:   2.0-3.0   TTR:   70.1 % (3 y)   Today's INR:   1.7!   Warfarin maintenance plan:   3 mg (2 mg x 1.5) on Sun, Wed; 2 mg (2 mg x 1) all other days   Weekly warfarin total:   16 mg   Weekly max warfarin dose:   20 mg   Plan last modified:   PABLO GarciaPEMMA (6/13/2018)   Next INR check:   7/5/2018   Target end date:   Indefinite    Indications    Pulmonary embolism (HCC) [I26.99]             Anticoagulation Episode Summary     [facility]    Preferred lab:       Send INR reminders to:       Comments:   *she has a non-renown PCP fax [phone]*      Anticoagulation Care Providers     Provider Role Specialty Phone number    Aron Patel M.D. Referring Internal Medicine [phone]    [facility] Responsible  [phone]        Anticoagulation Patient Findings      HPI:  Annamaria Jorgensen seen in clinic today for follow up on anticoagulation therapy in the presence of PE hx. Denies any changes to current medical/health status since last appointment. Denies any medication or diet changes. No current symptoms of bleeding or thrombosis reported. She is taking 2 mg daily, not 3 mg as previously charted. Pt lowered her dose because she felt 3 mg daily was too much.    A/P:   INR subtherapeutic. Will increase regimen by ~14%. BP recorded in vitals.    Follow up appointment in 3 week(s) per pt's preference.    Next Appointment: Thursday, June 5, 2018 at 1:30 pm.     Linda MISTRY

## 2018-07-31 ENCOUNTER — ANTICOAGULATION VISIT (OUTPATIENT)
Dept: VASCULAR LAB | Facility: MEDICAL CENTER | Age: 63
End: 2018-07-31
Attending: INTERNAL MEDICINE
Payer: MEDICARE

## 2018-07-31 VITALS
DIASTOLIC BLOOD PRESSURE: 65 MMHG | SYSTOLIC BLOOD PRESSURE: 109 MMHG | WEIGHT: 136 LBS | HEART RATE: 65 BPM | BODY MASS INDEX: 22.63 KG/M2

## 2018-07-31 DIAGNOSIS — I26.99 OTHER PULMONARY EMBOLISM WITHOUT ACUTE COR PULMONALE, UNSPECIFIED CHRONICITY (HCC): ICD-10-CM

## 2018-07-31 LAB — INR PPP: 2.2 (ref 2–3.5)

## 2018-07-31 PROCEDURE — 85610 PROTHROMBIN TIME: CPT

## 2018-07-31 NOTE — PROGRESS NOTES
Anticoagulation Summary  As of 7/31/2018    INR goal:   2.0-3.0   TTR:   68.8 % (3.1 y)   Today's INR:   2.2   Warfarin maintenance plan:   3 mg (2 mg x 1.5) on Sun, Wed; 2 mg (2 mg x 1) all other days   Weekly warfarin total:   16 mg   Weekly max warfarin dose:   20 mg   Plan last modified:   SHANNAN Garcia (6/13/2018)   Next INR check:   9/11/2018   Target end date:   Indefinite    Indications    Pulmonary embolism (HCC) [I26.99]             Anticoagulation Episode Summary     [facility]   [facility]    Preferred lab:       Send INR reminders to:       Comments:   *she has a non-renown PCP fax [phone]*      Anticoagulation Care Providers     Provider Role Specialty Phone number    Aron Patel M.D. Referring Internal Medicine [phone]    [facility] Responsible  [phone]        Anticoagulation Patient Findings    INR  therapeutic.   Denies signs/symptoms of bleeding and/or thrombosis.   Denies changes to diet or medications.   Follow up appointment in 6 week(s).    Continue weekly warfarin dose as noted      Kvng Mclaughlin, PharmD

## 2018-08-01 LAB — INR BLD: 2.2 (ref 0.9–1.2)

## 2018-08-27 ENCOUNTER — HOSPITAL ENCOUNTER (OUTPATIENT)
Dept: LAB | Facility: MEDICAL CENTER | Age: 63
End: 2018-08-27
Attending: NURSE PRACTITIONER
Payer: MEDICARE

## 2018-08-27 LAB
25(OH)D3 SERPL-MCNC: 47 NG/ML (ref 30–100)
ALBUMIN SERPL BCP-MCNC: 4.1 G/DL (ref 3.2–4.9)
ALBUMIN/GLOB SERPL: 1.6 G/DL
ALP SERPL-CCNC: 91 U/L (ref 30–99)
ALT SERPL-CCNC: 37 U/L (ref 2–50)
ANION GAP SERPL CALC-SCNC: 5 MMOL/L (ref 0–11.9)
APPEARANCE UR: CLEAR
AST SERPL-CCNC: 33 U/L (ref 12–45)
BASOPHILS # BLD AUTO: 1.1 % (ref 0–1.8)
BASOPHILS # BLD: 0.05 K/UL (ref 0–0.12)
BILIRUB SERPL-MCNC: 0.7 MG/DL (ref 0.1–1.5)
BILIRUB UR QL STRIP.AUTO: NEGATIVE
BUN SERPL-MCNC: 12 MG/DL (ref 8–22)
CALCIUM SERPL-MCNC: 9.6 MG/DL (ref 8.5–10.5)
CHLORIDE SERPL-SCNC: 104 MMOL/L (ref 96–112)
CHOLEST SERPL-MCNC: 192 MG/DL (ref 100–199)
CO2 SERPL-SCNC: 28 MMOL/L (ref 20–33)
COLOR UR: YELLOW
CREAT SERPL-MCNC: 0.82 MG/DL (ref 0.5–1.4)
EOSINOPHIL # BLD AUTO: 0.1 K/UL (ref 0–0.51)
EOSINOPHIL NFR BLD: 2.3 % (ref 0–6.9)
ERYTHROCYTE [DISTWIDTH] IN BLOOD BY AUTOMATED COUNT: 46.6 FL (ref 35.9–50)
EST. AVERAGE GLUCOSE BLD GHB EST-MCNC: 120 MG/DL
GLOBULIN SER CALC-MCNC: 2.5 G/DL (ref 1.9–3.5)
GLUCOSE SERPL-MCNC: 88 MG/DL (ref 65–99)
GLUCOSE UR STRIP.AUTO-MCNC: NEGATIVE MG/DL
HBA1C MFR BLD: 5.8 % (ref 0–5.6)
HCT VFR BLD AUTO: 40.9 % (ref 37–47)
HDLC SERPL-MCNC: 84 MG/DL
HGB BLD-MCNC: 13.2 G/DL (ref 12–16)
IMM GRANULOCYTES # BLD AUTO: 0.01 K/UL (ref 0–0.11)
IMM GRANULOCYTES NFR BLD AUTO: 0.2 % (ref 0–0.9)
KETONES UR STRIP.AUTO-MCNC: NEGATIVE MG/DL
LDLC SERPL CALC-MCNC: 100 MG/DL
LEUKOCYTE ESTERASE UR QL STRIP.AUTO: NEGATIVE
LYMPHOCYTES # BLD AUTO: 1.83 K/UL (ref 1–4.8)
LYMPHOCYTES NFR BLD: 41.2 % (ref 22–41)
MCH RBC QN AUTO: 30.3 PG (ref 27–33)
MCHC RBC AUTO-ENTMCNC: 32.3 G/DL (ref 33.6–35)
MCV RBC AUTO: 94 FL (ref 81.4–97.8)
MICRO URNS: NORMAL
MONOCYTES # BLD AUTO: 0.35 K/UL (ref 0–0.85)
MONOCYTES NFR BLD AUTO: 7.9 % (ref 0–13.4)
NEUTROPHILS # BLD AUTO: 2.1 K/UL (ref 2–7.15)
NEUTROPHILS NFR BLD: 47.3 % (ref 44–72)
NITRITE UR QL STRIP.AUTO: NEGATIVE
NRBC # BLD AUTO: 0 K/UL
NRBC BLD-RTO: 0 /100 WBC
PH UR STRIP.AUTO: 7.5 [PH]
PLATELET # BLD AUTO: 224 K/UL (ref 164–446)
PMV BLD AUTO: 11.2 FL (ref 9–12.9)
POTASSIUM SERPL-SCNC: 4 MMOL/L (ref 3.6–5.5)
PROT SERPL-MCNC: 6.6 G/DL (ref 6–8.2)
PROT UR QL STRIP: NEGATIVE MG/DL
RBC # BLD AUTO: 4.35 M/UL (ref 4.2–5.4)
RBC UR QL AUTO: NEGATIVE
SODIUM SERPL-SCNC: 137 MMOL/L (ref 135–145)
SP GR UR STRIP.AUTO: 1.01
T3 SERPL-MCNC: 83.3 NG/DL (ref 60–181)
T4 FREE SERPL-MCNC: 0.89 NG/DL (ref 0.53–1.43)
TRIGL SERPL-MCNC: 39 MG/DL (ref 0–149)
TSH SERPL DL<=0.005 MIU/L-ACNC: 1.79 UIU/ML (ref 0.38–5.33)
UROBILINOGEN UR STRIP.AUTO-MCNC: 0.2 MG/DL
WBC # BLD AUTO: 4.4 K/UL (ref 4.8–10.8)

## 2018-08-27 PROCEDURE — 83036 HEMOGLOBIN GLYCOSYLATED A1C: CPT

## 2018-08-27 PROCEDURE — 84480 ASSAY TRIIODOTHYRONINE (T3): CPT

## 2018-08-27 PROCEDURE — 84439 ASSAY OF FREE THYROXINE: CPT

## 2018-08-27 PROCEDURE — 36415 COLL VENOUS BLD VENIPUNCTURE: CPT

## 2018-08-27 PROCEDURE — 85025 COMPLETE CBC W/AUTO DIFF WBC: CPT

## 2018-08-27 PROCEDURE — 82306 VITAMIN D 25 HYDROXY: CPT

## 2018-08-27 PROCEDURE — 80061 LIPID PANEL: CPT

## 2018-08-27 PROCEDURE — 84443 ASSAY THYROID STIM HORMONE: CPT

## 2018-08-27 PROCEDURE — 80053 COMPREHEN METABOLIC PANEL: CPT

## 2018-08-27 PROCEDURE — 81003 URINALYSIS AUTO W/O SCOPE: CPT

## 2018-08-31 LAB — T4 FREE SERPL DIALY-MCNC: 1.3 NG/DL (ref 1.1–2.4)

## 2018-09-20 DIAGNOSIS — Z86.711 HISTORY OF PULMONARY EMBOLISM: ICD-10-CM

## 2018-09-20 RX ORDER — WARFARIN SODIUM 2 MG/1
TABLET ORAL
Qty: 135 TAB | Refills: 0 | Status: SHIPPED | OUTPATIENT
Start: 2018-09-20 | End: 2019-01-25 | Stop reason: SDUPTHER

## 2019-01-17 ENCOUNTER — ANTICOAGULATION VISIT (OUTPATIENT)
Dept: VASCULAR LAB | Facility: MEDICAL CENTER | Age: 64
End: 2019-01-17
Attending: INTERNAL MEDICINE
Payer: MEDICARE

## 2019-01-17 DIAGNOSIS — I26.99 OTHER PULMONARY EMBOLISM WITHOUT ACUTE COR PULMONALE, UNSPECIFIED CHRONICITY (HCC): ICD-10-CM

## 2019-01-17 LAB
INR BLD: 1.2 (ref 0.9–1.2)
INR PPP: 1.2 (ref 2–3.5)

## 2019-01-17 PROCEDURE — 99212 OFFICE O/P EST SF 10 MIN: CPT | Performed by: NURSE PRACTITIONER

## 2019-01-17 PROCEDURE — 85610 PROTHROMBIN TIME: CPT

## 2019-01-17 NOTE — PROGRESS NOTES
Anticoagulation Summary  As of 1/17/2019    INR goal:   2.0-3.0   TTR:   62.5 % (3.6 y)   Today's INR:   1.2!   Warfarin maintenance plan:   3 mg (2 mg x 1.5) on Sun, Wed; 2 mg (2 mg x 1) all other days   Weekly warfarin total:   16 mg   Weekly max warfarin dose:   20 mg   Plan last modified:   Linda De La Paz AFrancinePEMMA (6/13/2018)   Next INR check:   1/25/2019   Target end date:   Indefinite    Indications    Pulmonary embolism (HCC) [I26.99]             Anticoagulation Episode Summary     [facility]   [facility]    Preferred lab:       Send INR reminders to:       Comments:   *she has a non-renown PCP fax [phone]*      Anticoagulation Care Providers     Provider Role Specialty Phone number    Aron Patel M.D. Referring Internal Medicine [phone]    [facility] Responsible  [phone]        Anticoagulation Patient Findings      HPI:  Annamaria Jorgensen seen in clinic today for follow up on anticoagulation therapy in the presence of PE hx. Denies any changes to current medical/health status since last appointment. Denies any medication or diet changes. No current symptoms of bleeding or thrombosis reported. Pt hasn't had INR checked since July 2018. Confirmed dose. Doesn't think she missed any doses.    A/P:   INR subtherapeutic. No recent VTEs. Will give loading dose x 2 then continue current regimen. BP declined. Pt will be better about regular INR follow up. She will go to the ER for any s/sx of VTE which were explained.    Follow up appointment in 1 week(s).    Next Appointment: Friday, January 25, 2019 at 1;30 pm.     Linda MISTRY

## 2019-01-25 ENCOUNTER — ANTICOAGULATION VISIT (OUTPATIENT)
Dept: VASCULAR LAB | Facility: MEDICAL CENTER | Age: 64
End: 2019-01-25
Attending: INTERNAL MEDICINE
Payer: MEDICARE

## 2019-01-25 VITALS — SYSTOLIC BLOOD PRESSURE: 113 MMHG | DIASTOLIC BLOOD PRESSURE: 65 MMHG | HEART RATE: 82 BPM

## 2019-01-25 DIAGNOSIS — I26.99 OTHER PULMONARY EMBOLISM WITHOUT ACUTE COR PULMONALE, UNSPECIFIED CHRONICITY (HCC): ICD-10-CM

## 2019-01-25 DIAGNOSIS — Z86.711 HISTORY OF PULMONARY EMBOLISM: ICD-10-CM

## 2019-01-25 LAB
INR BLD: 2.1 (ref 0.9–1.2)
INR PPP: 2.1 (ref 2–3.5)

## 2019-01-25 PROCEDURE — 85610 PROTHROMBIN TIME: CPT

## 2019-01-25 PROCEDURE — 99211 OFF/OP EST MAY X REQ PHY/QHP: CPT

## 2019-01-25 RX ORDER — WARFARIN SODIUM 2 MG/1
TABLET ORAL
Qty: 135 TAB | Refills: 0 | Status: SHIPPED | OUTPATIENT
Start: 2019-01-25 | End: 2019-05-22 | Stop reason: SDUPTHER

## 2019-01-25 NOTE — PROGRESS NOTES
"Anticoagulation Summary  As of 1/25/2019    INR goal:   2.0-3.0   TTR:   62.1 % (3.6 y)   Today's INR:   2.1   Warfarin maintenance plan:   3 mg (2 mg x 1.5) on Sun, Wed; 2 mg (2 mg x 1) all other days   Weekly warfarin total:   16 mg   Weekly max warfarin dose:   20 mg   Plan last modified:   SHANNAN Garcia (6/13/2018)   Next INR check:   2/1/2019   Target end date:   Indefinite    Indications    Pulmonary embolism (HCC) [I26.99]             Anticoagulation Episode Summary     [facility]    Preferred lab:       Send INR reminders to:       Comments:   *she has a non-renown PCP fax [phone]*      Anticoagulation Care Providers     Provider Role Specialty Phone number    Aron Patel M.D. Referring Internal Medicine [phone]    [facility] Responsible  [phone]        Anticoagulation Patient Findings      HPI:  Annamaria Jorgensen seen in clinic today for follow up on anticoagulation therapy in the presence of PE. Denies any changes to current medical/health status since last appointment. Denies any medication or diet changes. No current symptoms of bleeding or thrombosis reported.    A/P:   INR is therapeutic at 2.1. Continue current regimen. BP recorded in vitals.  Pt states that she skipped last nights dose due to having a \"couple glasses of wine\"     Follow up appointment in 1 week(s).    Next Appointment: Friday , 02/01/2019     Dimitrios Tang, Alissa.D                  "

## 2019-02-06 ENCOUNTER — TELEPHONE (OUTPATIENT)
Dept: SCHEDULING | Facility: IMAGING CENTER | Age: 64
End: 2019-02-06

## 2019-02-11 ENCOUNTER — TELEPHONE (OUTPATIENT)
Dept: MEDICAL GROUP | Facility: MEDICAL CENTER | Age: 64
End: 2019-02-11

## 2019-02-14 ENCOUNTER — PATIENT OUTREACH (OUTPATIENT)
Dept: HEALTH INFORMATION MANAGEMENT | Facility: OTHER | Age: 64
End: 2019-02-14

## 2019-02-14 NOTE — PROGRESS NOTES
Outcome: WRONG NUMBER FOR HOME AND CELL OUT OF ERVICE    Please transfer to Patient Outreach Team at [phone] when patient returns call.         [facility] Verified: yes    Attempt # 1

## 2019-02-19 ENCOUNTER — ANTICOAGULATION VISIT (OUTPATIENT)
Dept: VASCULAR LAB | Facility: MEDICAL CENTER | Age: 64
End: 2019-02-19
Attending: INTERNAL MEDICINE
Payer: MEDICARE

## 2019-02-19 ENCOUNTER — OFFICE VISIT (OUTPATIENT)
Dept: MEDICAL GROUP | Facility: MEDICAL CENTER | Age: 64
End: 2019-02-19
Payer: MEDICARE

## 2019-02-19 VITALS
TEMPERATURE: 97.2 F | HEART RATE: 77 BPM | WEIGHT: 141 LBS | SYSTOLIC BLOOD PRESSURE: 106 MMHG | DIASTOLIC BLOOD PRESSURE: 70 MMHG | RESPIRATION RATE: 16 BRPM | HEIGHT: 65 IN | BODY MASS INDEX: 23.49 KG/M2 | OXYGEN SATURATION: 98 %

## 2019-02-19 VITALS — RESPIRATION RATE: 12 BRPM | DIASTOLIC BLOOD PRESSURE: 61 MMHG | SYSTOLIC BLOOD PRESSURE: 106 MMHG | HEART RATE: 69 BPM

## 2019-02-19 DIAGNOSIS — R73.01 IMPAIRED FASTING GLUCOSE: ICD-10-CM

## 2019-02-19 DIAGNOSIS — F33.0 MILD EPISODE OF RECURRENT MAJOR DEPRESSIVE DISORDER (HCC): ICD-10-CM

## 2019-02-19 DIAGNOSIS — Z98.84 S/P LAPAROSCOPIC SLEEVE GASTRECTOMY: ICD-10-CM

## 2019-02-19 DIAGNOSIS — I27.82 OTHER CHRONIC PULMONARY EMBOLISM WITHOUT ACUTE COR PULMONALE (HCC): ICD-10-CM

## 2019-02-19 DIAGNOSIS — I26.99 OTHER PULMONARY EMBOLISM WITHOUT ACUTE COR PULMONALE, UNSPECIFIED CHRONICITY (HCC): ICD-10-CM

## 2019-02-19 DIAGNOSIS — Z12.39 SCREENING FOR BREAST CANCER: ICD-10-CM

## 2019-02-19 DIAGNOSIS — R19.7 DIARRHEA, UNSPECIFIED TYPE: ICD-10-CM

## 2019-02-19 DIAGNOSIS — Z12.11 SCREEN FOR COLON CANCER: ICD-10-CM

## 2019-02-19 DIAGNOSIS — Z23 NEED FOR PNEUMOCOCCAL VACCINATION: ICD-10-CM

## 2019-02-19 DIAGNOSIS — Z00.00 MEDICARE ANNUAL WELLNESS VISIT, SUBSEQUENT: ICD-10-CM

## 2019-02-19 LAB — INR PPP: 1.8 (ref 2–3.5)

## 2019-02-19 PROCEDURE — 99212 OFFICE O/P EST SF 10 MIN: CPT | Performed by: NURSE PRACTITIONER

## 2019-02-19 PROCEDURE — 8041 PR SCP AHA: Performed by: NURSE PRACTITIONER

## 2019-02-19 PROCEDURE — 99213 OFFICE O/P EST LOW 20 MIN: CPT | Mod: 25 | Performed by: NURSE PRACTITIONER

## 2019-02-19 PROCEDURE — G0438 PPPS, INITIAL VISIT: HCPCS | Performed by: NURSE PRACTITIONER

## 2019-02-19 PROCEDURE — G0009 ADMIN PNEUMOCOCCAL VACCINE: HCPCS | Performed by: NURSE PRACTITIONER

## 2019-02-19 PROCEDURE — 90670 PCV13 VACCINE IM: CPT | Performed by: NURSE PRACTITIONER

## 2019-02-19 PROCEDURE — 85610 PROTHROMBIN TIME: CPT

## 2019-02-19 RX ORDER — BUPROPION HYDROCHLORIDE 300 MG/1
300 TABLET ORAL EVERY MORNING
Qty: 30 TAB | Refills: 11 | Status: SHIPPED | OUTPATIENT
Start: 2019-02-19 | End: 2020-03-11

## 2019-02-19 ASSESSMENT — ENCOUNTER SYMPTOMS: GENERAL WELL-BEING: GOOD

## 2019-02-19 ASSESSMENT — ACTIVITIES OF DAILY LIVING (ADL): BATHING_REQUIRES_ASSISTANCE: 0

## 2019-02-19 ASSESSMENT — PATIENT HEALTH QUESTIONNAIRE - PHQ9: CLINICAL INTERPRETATION OF PHQ2 SCORE: 0

## 2019-02-19 NOTE — PROGRESS NOTES
Annual Health Assessment Questions:    1.  Are you currently engaging in any exercise or physical activity? Yes    2.  How would you describe your mood or emotional well-being today? anxious    3.  Have you had any falls in the last year? No    4.  Have you noticed any problems with your balance or had difficulty walking? Yes    5.  In the last six months have you experienced any leakage of urine? Yes    6. DPA/Advanced Directive: Patient does not have an Advanced Directive.  A packet and workshop information was given on Advanced Directives.

## 2019-02-19 NOTE — PROGRESS NOTES
CC: Patient here today to establish care, eating AHA and annual Medicare wellness visit as well as to discuss going back on medication for depression.    HPI:   Annamaria presents today with the following.    1. Medicare annual wellness visit, subsequent  Screening performed below.    2. Mild episode of recurrent major depressive disorder (HCC)  Patient reports history of depression and was taking Wellbutrin 300 mg long-acting once a day up until about 2 months ago.  She states she had been on it for 2 or 3 years and it did work well for her but with changing her PCP, she states they would not refill her medicine.  She states she did do better on the medication and had less anxiety and depression so would like to start back on this.  He does not feel she has severe depression or needs to see a counselor.    3. Other pulmonary embolism without acute cor pulmonale, unspecified chronicity (HCC)  Patient currently [facility] which is providing lifelong anticoagulation due to recurrent pulmonary embolism.    4. S/P laparoscopic sleeve gastrectomy  It appears that one time patient was obese but with gastric sleeve surgery, she is now within normal range with weight.    5. Impaired fasting glucose  Last blood work for her PCP in August showed hemoglobin A1c of 5.8.    6. Need for pneumococcal vaccination  Patient needs first pneumonia vaccine for after age 65.     7. Screening for breast cancer  Patient due for mammogram.    8. Screen for colon cancer  Patient reports her last colonoscopy was over 5 years ago and she states she was supposed to have another due to some colon polyps.    9. Diarrhea, unspecified type  Patient would like to do some lab work because she had a few episodes of diarrhea that were light colored a week ago.  She no longer is having diarrhea or nausea which she also reports.  She would like to check her liver functions although her last chemistry panel from August was completely  normal as was her TSH and lipid panel.    Annual Health Assessment Questions:    1.  Are you currently engaging in any exercise or physical activity? Yes    2.  How would you describe your mood or emotional well-being today? anxious    3.  Have you had any falls in the last year? No    4.  Have you noticed any problems with your balance or had difficulty walking? Yes    5.  In the last six months have you experienced any leakage of urine? Yes    6. DPA/Advanced Directive: Patient does not have an Advanced Directive.  A packet and workshop information was given on Advanced Directives.  Depression Screening    Little interest or pleasure in doing things?  0 - not at all  Feeling down, depressed , or hopeless? 0 - not at all  Patient Health Questionnaire Score: 0     If depressive symptoms identified deferred to follow up visit unless specifically addressed in assessment and plan.    Interpretation of PHQ-9 Total Score   Score Severity   1-4 No Depression   5-9 Mild Depression   10-14 Moderate Depression   15-19 Moderately Severe Depression   20-27 Severe Depression    Screening for Cognitive Impairment    Three Minute Recall (leader, season, table) 3/3    Dilan clock face with all 12 numbers and set the hands to show 10 past 11.       Cognitive concerns identified deferred for follow up unless specifically addressed in assessment and plan.    Fall Risk Assessment    Has the patient had two or more falls in the last year or any fall with injury in the last year?  No    Safety Assessment    Throw rugs on floor.  Yes  Handrails on all stairs.  Yes  Good lighting in all hallways.  Yes  Difficulty hearing.  No  Patient counseled about all safety risks that were identified.    Functional Assessment ADLs    Are there any barriers preventing you from cooking for yourself or meeting nutritional needs?  No.    Are there any barriers preventing you from driving safely or obtaining transportation?  No.    Are there any barriers  preventing you from using a telephone or calling for help?  No.    Are there any barriers preventing you from shopping?  No.    Are there any barriers preventing you from taking care of your own finances?  No.    Are there any barriers preventing you from managing your medications?  No.    Are there any barriers preventing you from showering, bathing or dressing yourself?  No.    Are you currently engaging in any exercise or physical activity?  Yes.     What is your perception of your health?  Good.      Health Maintenance Summary                Annual Wellness Visit Overdue 1955     PAP SMEAR Overdue 3/6/1974     COLONOSCOPY Overdue 3/6/2003     MAMMOGRAM Overdue 1/14/2016      Done 1/14/2015 MA-SCREENING MAMMOGRAM W/ CAD     Patient has more history with this topic...    IMM PNEUMOCOCCAL 65+ (ADULT) LOW/MEDIUM RISK SERIES Overdue 3/6/2018     IMM INFLUENZA Postponed 2/11/2021 Originally 9/1/2018. Patient Refused     Done 10/12/2016 Imm Admin: Influenza Vaccine Quad Inj (Pf)     Patient has more history with this topic...    IMM ZOSTER VACCINES Postponed 2/17/2023 Originally 12/7/2016. System: vaccine not available, other system reasons     Done 10/12/2016 Imm Admin: Zoster Vaccine Live (ZVL) (Zostavax)    BONE DENSITY Next Due 4/21/2021      Done 4/21/2016 DS-BONE DENSITY STUDY (DEXA)     Patient has more history with this topic...    IMM DTaP/Tdap/Td Vaccine Next Due 5/25/2026      Done 5/25/2016 Imm Admin: Tdap Vaccine          Patient Care Team:  SHANNAN Roach as PCP - General (Internal Medicine)          Patient Active Problem List    Diagnosis Date Noted   • S/P laparoscopic sleeve gastrectomy 02/19/2019   • Mild episode of recurrent major depressive disorder (HCC) 02/19/2019   • Impaired fasting glucose 02/19/2019   • Pulmonary embolism (HCC) 04/30/2015       Current Outpatient Prescriptions   Medication Sig Dispense Refill   • buPROPion (WELLBUTRIN XL) 300 MG XL tablet Take 1 Tab by mouth every  "morning. 30 Tab 11   • warfarin (COUMADIN) 2 MG Tab TAKE 1 TO 1 1/2 TABLETS BY MOUTH DAILY AS DIRECTED BY ANTICOAGULATION SERVICE. 135 Tab 0     No current facility-administered medications for this visit.          Allergies as of 02/19/2019 - Reviewed 02/19/2019   Allergen Reaction Noted   • Erythromycin  03/23/2011   • Levaquin  04/22/2010   • Tape  07/07/2008        ROS: As per HPI.    /70 (BP Location: Right arm, Patient Position: Sitting, BP Cuff Size: Adult)   Pulse 77   Temp 36.2 °C (97.2 °F) (Temporal)   Resp 16   Ht 1.651 m (5' 5\")   Wt 64 kg (141 lb)   SpO2 98%   BMI 23.46 kg/m²     Physical Exam:  Gen:         Alert and oriented, No apparent distress.  Neck:        No Lymphadenopathy or Bruits.  Lungs:     Clear to auscultation bilaterally  CV:          Regular rate and rhythm. No murmurs, rubs or gallops.  Abd:         Soft non tender, non distended. Normal active bowel sounds.  No  Hepatosplenomegaly, No pulsatile masses.                   Ext:          No clubbing, cyanosis, edema.      Assessment and Plan.   63 y.o. female with the following issues.    1. Medicare annual wellness visit, subsequent  Annual wellness topics discussed, review of chronic medical problems completed    - Initial Annual Wellness Visit - Includes PPPS ()    2. Mild episode of recurrent major depressive disorder (HCC)  I reviewed with patient the pros and cons of staying on antidepressant medicine but she did feel it helped with her depression so she would like to start back on this and it was represcribed today.  She has been off it for about 2 months.  I reminded her always to wean off these medicines in the future should she decide to stop it.  She did not wish to go to counseling.  - buPROPion (WELLBUTRIN XL) 300 MG XL tablet; Take 1 Tab by mouth every morning.  Dispense: 30 Tab; Refill: 11    3. Other pulmonary embolism without acute cor pulmonale, unspecified chronicity (HCC)  Patient will continue to " follow with the [facility] clinic which is monitoring her INR and prescribing her Coumadin.    4. S/P laparoscopic sleeve gastrectomy  Patient's BMI is now good.  Last vitamin levels were good.    5. Impaired fasting glucose  Patient advised that she is on the border of prediabetes and should watch her simple carbs.    6. Need for pneumococcal vaccination  I have placed the below orders and discussed them with an approved delegating provider. The MA is performing the below orders under the direction of Dr. Evans    - Prevnar 13 PCV-13    7. Screening for breast cancer    - MA-SCREEN MAMMO W/CAD-BILAT; Future    8. Screen for colon cancer    - REFERRAL TO GI FOR COLONOSCOPY    9. Diarrhea, unspecified type  Patient not currently having diarrhea so no stool testing needed and I will do some basic blood work for her as she requests.  I told her if this should continue or she should have nausea and vomiting that persist, we would want to do a abdominal ultrasound and get her to GI.  - Comp Metabolic Panel; Future  - CBC WITHOUT DIFFERENTIAL; Future

## 2019-02-19 NOTE — PROGRESS NOTES
Anticoagulation Summary  As of 2019    INR goal:   2.0-3.0   TTR:   61.6 % (3.6 y)   INR used for dosin.8! (2019)   Warfarin maintenance plan:   3 mg (2 mg x 1.5) every Sun, Wed; 2 mg (2 mg x 1) all other days   Weekly warfarin total:   16 mg   Weekly max warfarin dose:   20 mg   Plan last modified:   SHANNAN Garcia (2018)   Next INR check:   3/12/2019   Target end date:   Indefinite    Indications    Pulmonary embolism (HCC) [I26.99]             Anticoagulation Episode Summary     [facility]    Preferred lab:       Send INR reminders to:       Comments:   *she has a non-renown PCP fax [phone]*      Anticoagulation Care Providers     Provider Role Specialty Phone number    Aron Patel M.D. Referring Internal Medicine [phone]    [facility] Responsible  [phone]        Anticoagulation Patient Findings        Annamaria Jorgensen seen in clinic today, is on anticoagulation therapy with warfarin for PE.   INR  sub-therapeutic.    Changes to current medical/health status since last appt: none.   Denies signs/symptoms of bleeding and/or thrombosis since the last appt.   Denies any interval changes to diet  Denies any interval changes to medications since last appt.   Denies any complications or cost restrictions with current therapy  Follow up appointment in 3 week(s).      Take 4 mg tonight one time only        Patient is on a high risk medication and therefore requires close monitoring and follow up.     CHEST guidelines recommend frequent INR monitoring at regular intervals (a few days up to a max of 12 weeks) to ensure they are on the proper dose of warfarin and not having any complications from therapy.  INRs can dramatically change over a short time period due to diet, medications, and medical conditions.   The patient instructed to go to the ER for falls with a head injury,  blood in urine or stool or any bleeding that last  longer than 20 min.      KEN Nj.

## 2019-02-21 ENCOUNTER — HOSPITAL ENCOUNTER (OUTPATIENT)
Dept: LAB | Facility: MEDICAL CENTER | Age: 64
End: 2019-02-21
Attending: NURSE PRACTITIONER
Payer: MEDICARE

## 2019-02-21 DIAGNOSIS — R19.7 DIARRHEA, UNSPECIFIED TYPE: ICD-10-CM

## 2019-02-21 LAB
ALBUMIN SERPL BCP-MCNC: 3.8 G/DL (ref 3.2–4.9)
ALBUMIN/GLOB SERPL: 1.5 G/DL
ALP SERPL-CCNC: 77 U/L (ref 30–99)
ALT SERPL-CCNC: 30 U/L (ref 2–50)
ANION GAP SERPL CALC-SCNC: 6 MMOL/L (ref 0–11.9)
AST SERPL-CCNC: 25 U/L (ref 12–45)
BILIRUB SERPL-MCNC: 0.6 MG/DL (ref 0.1–1.5)
BUN SERPL-MCNC: 11 MG/DL (ref 8–22)
CALCIUM SERPL-MCNC: 9.2 MG/DL (ref 8.5–10.5)
CHLORIDE SERPL-SCNC: 108 MMOL/L (ref 96–112)
CO2 SERPL-SCNC: 28 MMOL/L (ref 20–33)
CREAT SERPL-MCNC: 0.72 MG/DL (ref 0.5–1.4)
ERYTHROCYTE [DISTWIDTH] IN BLOOD BY AUTOMATED COUNT: 47.3 FL (ref 35.9–50)
FASTING STATUS PATIENT QL REPORTED: NORMAL
GLOBULIN SER CALC-MCNC: 2.5 G/DL (ref 1.9–3.5)
GLUCOSE SERPL-MCNC: 91 MG/DL (ref 65–99)
HCT VFR BLD AUTO: 38.1 % (ref 37–47)
HGB BLD-MCNC: 12.2 G/DL (ref 12–16)
INR BLD: 1.8 (ref 0.9–1.2)
MCH RBC QN AUTO: 31 PG (ref 27–33)
MCHC RBC AUTO-ENTMCNC: 32 G/DL (ref 33.6–35)
MCV RBC AUTO: 96.7 FL (ref 81.4–97.8)
PLATELET # BLD AUTO: 238 K/UL (ref 164–446)
PMV BLD AUTO: 10.9 FL (ref 9–12.9)
POTASSIUM SERPL-SCNC: 3.8 MMOL/L (ref 3.6–5.5)
PROT SERPL-MCNC: 6.3 G/DL (ref 6–8.2)
RBC # BLD AUTO: 3.94 M/UL (ref 4.2–5.4)
SODIUM SERPL-SCNC: 142 MMOL/L (ref 135–145)
WBC # BLD AUTO: 5.9 K/UL (ref 4.8–10.8)

## 2019-02-21 PROCEDURE — 80053 COMPREHEN METABOLIC PANEL: CPT

## 2019-02-21 PROCEDURE — 36415 COLL VENOUS BLD VENIPUNCTURE: CPT

## 2019-02-21 PROCEDURE — 85027 COMPLETE CBC AUTOMATED: CPT

## 2019-04-01 ENCOUNTER — OFFICE VISIT (OUTPATIENT)
Dept: URGENT CARE | Facility: CLINIC | Age: 64
End: 2019-04-01
Payer: MEDICARE

## 2019-04-01 VITALS
TEMPERATURE: 98 F | OXYGEN SATURATION: 96 % | BODY MASS INDEX: 23.49 KG/M2 | DIASTOLIC BLOOD PRESSURE: 68 MMHG | RESPIRATION RATE: 16 BRPM | SYSTOLIC BLOOD PRESSURE: 124 MMHG | HEART RATE: 94 BPM | WEIGHT: 141 LBS | HEIGHT: 65 IN

## 2019-04-01 DIAGNOSIS — S51.811A SKIN TEAR OF RIGHT FOREARM WITHOUT COMPLICATION, INITIAL ENCOUNTER: ICD-10-CM

## 2019-04-01 DIAGNOSIS — Z79.01 WARFARIN ANTICOAGULATION: ICD-10-CM

## 2019-04-01 PROCEDURE — 99214 OFFICE O/P EST MOD 30 MIN: CPT | Performed by: PHYSICIAN ASSISTANT

## 2019-04-01 RX ORDER — AMOXICILLIN 500 MG/1
500 CAPSULE ORAL 3 TIMES DAILY
Qty: 15 CAP | Refills: 0 | Status: SHIPPED | OUTPATIENT
Start: 2019-04-01 | End: 2019-04-06

## 2019-04-01 ASSESSMENT — ENCOUNTER SYMPTOMS
NUMBNESS: 0
FEVER: 0
EYE DISCHARGE: 0
EYE REDNESS: 0
CHILLS: 0
SPEECH CHANGE: 0
TINGLING: 0

## 2019-04-02 ENCOUNTER — ANTICOAGULATION VISIT (OUTPATIENT)
Dept: VASCULAR LAB | Facility: MEDICAL CENTER | Age: 64
End: 2019-04-02
Attending: INTERNAL MEDICINE
Payer: MEDICARE

## 2019-04-02 VITALS — RESPIRATION RATE: 12 BRPM | SYSTOLIC BLOOD PRESSURE: 125 MMHG | HEART RATE: 67 BPM | DIASTOLIC BLOOD PRESSURE: 55 MMHG

## 2019-04-02 DIAGNOSIS — I27.82 OTHER CHRONIC PULMONARY EMBOLISM WITHOUT ACUTE COR PULMONALE (HCC): ICD-10-CM

## 2019-04-02 LAB
INR BLD: 1.6 (ref 0.9–1.2)
INR PPP: 1.6 (ref 2–3.5)

## 2019-04-02 PROCEDURE — 85610 PROTHROMBIN TIME: CPT

## 2019-04-02 PROCEDURE — 99212 OFFICE O/P EST SF 10 MIN: CPT | Performed by: NURSE PRACTITIONER
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
## 2019-04-02 NOTE — PROGRESS NOTES
Anticoagulation Summary  As of 2019    INR goal:   2.0-3.0   TTR:   59.7 % (3.8 y)   INR used for dosin.6! (2019)   Warfarin maintenance plan:   3 mg (2 mg x 1.5) every Sun, Wed; 2 mg (2 mg x 1) all other days   Weekly warfarin total:   16 mg   Weekly max warfarin dose:   20 mg   Plan last modified:   SHANNAN Garcia (2018)   Next INR check:   2019   Target end date:   Indefinite    Indications    Pulmonary embolism (HCC) [I26.99]             Anticoagulation Episode Summary     [facility]    Preferred lab:       Send INR reminders to:       Comments:   *she has a non-renown PCP fax [phone]*      Anticoagulation Care Providers     Provider Role Specialty Phone number    Aron Patel M.D. Referring Internal Medicine [phone]    [facility] Responsible  [phone]        Anticoagulation Patient Findings          Annamaria Jorgensen seen in clinic today, is on anticoagulation therapy with warfarin for PE.   INR  sub-therapeutic. But pt started on Amoxicillin today     Changes to current medical/health status since last appt: none.   Denies signs/symptoms of bleeding and/or thrombosis since the last appt.   Denies any interval changes to diet  Denies any interval changes to medications since last appt.   Denies any complications or cost restrictions with current therapy  Follow up appointment in 4 days(s).      Take 3 mg tonight and then back to usual dose due to amoxicillin dose. Test on Friday.   The patient is on a high risk medication and is sub- therapeutic. This could lead to clot formation or risk of stroke. Therefore this patient requires close monitoring and follow up.      CHEST guidelines recommend frequent INR monitoring at regular intervals (a few days up to a max of 12 weeks) to ensure they are on the proper dose of warfarin and not having any complications from therapy.  INRs can dramatically change over a short time period  due to diet, medications, and medical conditions.     The patient instructed to go to the ER for falls with a head injury,  blood in urine or stool or any bleeding that last longer than 20 min.      KEN Nj.

## 2019-05-22 DIAGNOSIS — Z86.711 HISTORY OF PULMONARY EMBOLISM: ICD-10-CM

## 2019-05-22 RX ORDER — WARFARIN SODIUM 2 MG/1
TABLET ORAL
Qty: 45 TAB | Refills: 0 | Status: SHIPPED | OUTPATIENT
Start: 2019-05-22 | End: 2019-05-30 | Stop reason: SDUPTHER

## 2019-05-22 NOTE — TELEPHONE ENCOUNTER
[facility]    Left  requesting pt call back to set up appointment for next INR.  30 day fill for warfarin granted but no additional refills until next INR.     Ray Brizuela, PharmD

## 2019-05-30 ENCOUNTER — ANTICOAGULATION VISIT (OUTPATIENT)
Dept: VASCULAR LAB | Facility: MEDICAL CENTER | Age: 64
End: 2019-05-30
Attending: INTERNAL MEDICINE
Payer: MEDICARE

## 2019-05-30 VITALS — HEART RATE: 68 BPM | DIASTOLIC BLOOD PRESSURE: 54 MMHG | SYSTOLIC BLOOD PRESSURE: 99 MMHG

## 2019-05-30 DIAGNOSIS — Z86.711 HISTORY OF PULMONARY EMBOLISM: ICD-10-CM

## 2019-05-30 DIAGNOSIS — I27.82 OTHER CHRONIC PULMONARY EMBOLISM WITHOUT ACUTE COR PULMONALE (HCC): ICD-10-CM

## 2019-05-30 LAB — INR PPP: 1.4 (ref 2–3.5)

## 2019-05-30 PROCEDURE — 99212 OFFICE O/P EST SF 10 MIN: CPT | Performed by: PHARMACIST

## 2019-05-30 PROCEDURE — 85610 PROTHROMBIN TIME: CPT

## 2019-05-30 RX ORDER — WARFARIN SODIUM 2 MG/1
TABLET ORAL
Qty: 135 TAB | Refills: 2 | Status: SHIPPED | OUTPATIENT
Start: 2019-05-30 | End: 2020-03-20 | Stop reason: SDUPTHER

## 2019-05-30 NOTE — PROGRESS NOTES
Anticoagulation Summary  As of 2019    INR goal:   2.0-3.0   TTR:   57.3 % (3.9 y)   INR used for dosin.40! (2019)   Warfarin maintenance plan:   3 mg (2 mg x 1.5) every Sun, Wed; 2 mg (2 mg x 1) all other days   Weekly warfarin total:   16 mg   Weekly max warfarin dose:   20 mg   Plan last modified:   SHANNAN Garcia (2018)   Next INR check:   2019   Target end date:   Indefinite    Indications    Pulmonary embolism (HCC) [I26.99]             Anticoagulation Episode Summary     [facility]    Preferred lab:       Send INR reminders to:       Comments:   *she has a non-renown PCP fax [phone]*      Anticoagulation Care Providers     Provider Role Specialty Phone number    Aron Patel M.D. Referring Internal Medicine [phone]    [facility] Responsible  [phone]        Anticoagulation Patient Findings      HPI:  Annamaria Jorgensen seen in clinic today, on anticoagulation therapy with warfarin for PE  Changes to current medical/health status since last appt: pt has been moving, missed a dose of warfarin.   Denies signs/symptoms of bleeding and/or thrombosis since the last appt.    Denies any interval changes to diet  Denies any interval changes to medications since last appt.   Denies any complications or cost restrictions with current therapy.   BP recorded in vitals.       A/P   INR  is sub-therapeutic.   Most likely due to missed dose, will have pt take a boost 4mg today and then resume normal warfarin dosing.     Follow up appointment in 3 week(s) per pt    Evelina Gutierrez, PharmD

## 2019-05-31 LAB — INR BLD: 1.4 (ref 0.9–1.2)

## 2019-10-23 ENCOUNTER — TELEPHONE (OUTPATIENT)
Dept: MEDICAL GROUP | Facility: MEDICAL CENTER | Age: 64
End: 2019-10-23

## 2019-12-27 ENCOUNTER — ANTICOAGULATION MONITORING (OUTPATIENT)
Dept: VASCULAR LAB | Facility: MEDICAL CENTER | Age: 64
End: 2019-12-27

## 2019-12-27 DIAGNOSIS — I27.82 OTHER CHRONIC PULMONARY EMBOLISM WITHOUT ACUTE COR PULMONALE (HCC): ICD-10-CM

## 2020-01-08 ENCOUNTER — PATIENT OUTREACH (OUTPATIENT)
Dept: HEALTH INFORMATION MANAGEMENT | Facility: OTHER | Age: 65
End: 2020-01-08

## 2020-01-08 NOTE — PROGRESS NOTES
Outcome: Left Message    Please transfer to Patient Outreach Team at [phone] when patient returns call.    [facility] Verified: yes    Attempt # 1

## 2020-01-10 ENCOUNTER — TELEPHONE (OUTPATIENT)
Dept: VASCULAR LAB | Facility: MEDICAL CENTER | Age: 65
End: 2020-01-10

## 2020-01-10 DIAGNOSIS — Z79.01 CHRONIC ANTICOAGULATION: ICD-10-CM

## 2020-01-10 NOTE — TELEPHONE ENCOUNTER
Left message for pt to have INR checked  2nd call  Annamaria Lopez, Clinical Pharmacist, CDE, CACP

## 2020-01-23 ENCOUNTER — TELEPHONE (OUTPATIENT)
Dept: VASCULAR LAB | Facility: MEDICAL CENTER | Age: 65
End: 2020-01-23

## 2020-01-23 NOTE — TELEPHONE ENCOUNTER
Left message for pt to have INR checked  3rd call  Letter sent  Annamaria Lopez, Clinical Pharmacist, CDE, CACP

## 2020-01-23 NOTE — LETTER
January 23, 2020        Annamaria Jorgensen  [address]  [address]  [place] [address]        Dear Annamaria Jorgensen ,    We have been unsuccessful in our attempts to contact you regarding your Anticoagulation Service appointments. Warfarin is a potent blood-thinning agent that requires monitoring to ensure that the dosage is correct for your body.  If it isn't, you could develop serious, sometimes life-threatening bleeding problems or life-threatening blood clots or stroke could result.     It is extremely important that you have your lab work drawn as soon as possible.  We are open Monday-Friday 8 am until 5 pm.  You may reach our Service at [phone].     To monitor you effectively, we need to be able to communicate with you.  This is a requirement to be followed by our Service.  If we are unable to contact you on three separate occasions, you will be discharged from the Anticoagulation Service.     If you repeatedly fail to keep your lab appointments, you are at risk of being discharged from the Service.           Sincerely,           Anselmo Seaman, PharmD, [facility]  Pharmacy Clinical Supervisor  [facility]  Outpatient Anticoagulation Service

## 2020-02-18 ENCOUNTER — TELEPHONE (OUTPATIENT)
Dept: VASCULAR LAB | Facility: MEDICAL CENTER | Age: 65
End: 2020-02-18

## 2020-02-18 NOTE — LETTER
February 18, 2020          Annamaria Jorgensen  [address]  [address]  [place] [address]        Dear Annamaria Jorgensen ,    We have been unsuccessful in our attempts to contact you regarding your Anticoagulation Service appointments. Warfarin is a potent blood-thinning agent that requires monitoring to ensure that the dosage is correct for your body.  If it isn't, you could develop serious, sometimes life-threatening bleeding problems or life-threatening blood clots or stroke could result.     It is extremely important that you have your lab work drawn as soon as possible.  We are open Monday-Friday 8 am until 5 pm.  You may reach our Service at [phone].     To monitor you effectively, we need to be able to communicate with you.  This is a requirement to be followed by our Service.  If we are unable to contact you on three separate occasions, you will be discharged from the Anticoagulation Service.     If you repeatedly fail to keep your lab appointments, you are at risk of being discharged from the Service.           Sincerely,           Anselmo Seaman, PharmD, [facility]  Pharmacy Clinical Supervisor  [facility]  Outpatient Anticoagulation Service

## 2020-02-18 NOTE — TELEPHONE ENCOUNTER
Left message for pt to have INR checked  4th call  2nd letter sent.  Annamaria Lopez, Clinical Pharmacist, CDE, CACP

## 2020-02-19 ENCOUNTER — ANTICOAGULATION VISIT (OUTPATIENT)
Dept: VASCULAR LAB | Facility: MEDICAL CENTER | Age: 65
End: 2020-02-19
Attending: INTERNAL MEDICINE
Payer: MEDICARE

## 2020-02-19 VITALS — DIASTOLIC BLOOD PRESSURE: 69 MMHG | HEART RATE: 76 BPM | SYSTOLIC BLOOD PRESSURE: 136 MMHG

## 2020-02-19 DIAGNOSIS — I27.82 OTHER CHRONIC PULMONARY EMBOLISM WITHOUT ACUTE COR PULMONALE (HCC): ICD-10-CM

## 2020-02-19 LAB
INR BLD: 1.4 (ref 0.9–1.2)
INR PPP: 1.4 (ref 2–3.5)

## 2020-02-19 PROCEDURE — 99212 OFFICE O/P EST SF 10 MIN: CPT | Performed by: PHARMACIST

## 2020-02-19 PROCEDURE — 85610 PROTHROMBIN TIME: CPT

## 2020-02-20 NOTE — PROGRESS NOTES
Anticoagulation Summary  As of 2020    INR goal:   2.0-3.0   TTR:   48.3 % (4.6 y)   INR used for dosin.40! (2020)   Warfarin maintenance plan:   3 mg (2 mg x 1.5) every Sun, Wed, Fri; 2 mg (2 mg x 1) all other days   Weekly warfarin total:   17 mg   Weekly max warfarin dose:   20 mg   Plan last modified:   Evelina Gutierrez PharmD (2020)   Next INR check:   3/4/2020   Target end date:   Indefinite    Indications    Pulmonary embolism (HCC) [I26.99]             Anticoagulation Episode Summary     INR check location:   Anticoagulation Clinic    Preferred lab:       Send INR reminders to:       Comments:   *she has a non-renown PCP fax [phone]*      Anticoagulation Care Providers     Provider Role Specialty Phone number    Aron Patel M.D. Referring Internal Medicine [phone]    [facility] Responsible  [phone]                Anticoagulation Patient Findings      HPI:  Annamaria Jorgensen seen in clinic today, on anticoagulation therapy with warfarin for PE  Changes to current medical/health status since last appt: stress  Denies signs/symptoms of bleeding and/or thrombosis since the last appt.    Denies any interval changes to diet  Denies any interval changes to medications since last appt.   Denies any complications or cost restrictions with current therapy.   Verified current warfarin dosing schedule.   BP recorded in vitals.       A/P   INR  is sub-therapeutic.   Will have pt take a boost dose of 4mg x1 today and then start a 7% increased weekly warfarin dose.     Follow up appointment in 2 week(s).    Evelina Gutierrez, PharmD

## 2020-03-04 ENCOUNTER — ANTICOAGULATION VISIT (OUTPATIENT)
Dept: VASCULAR LAB | Facility: MEDICAL CENTER | Age: 65
End: 2020-03-04
Attending: INTERNAL MEDICINE
Payer: MEDICARE

## 2020-03-04 DIAGNOSIS — I27.82 OTHER CHRONIC PULMONARY EMBOLISM WITHOUT ACUTE COR PULMONALE (HCC): ICD-10-CM

## 2020-03-04 LAB
INR BLD: 1.8 (ref 0.9–1.2)
INR PPP: 1.8 (ref 2–3.5)

## 2020-03-04 PROCEDURE — 85610 PROTHROMBIN TIME: CPT

## 2020-03-04 PROCEDURE — 99212 OFFICE O/P EST SF 10 MIN: CPT | Performed by: PHARMACIST

## 2020-03-04 RX ORDER — WARFARIN SODIUM 3 MG/1
TABLET ORAL
Qty: 30 TAB | Refills: 2 | Status: SHIPPED | OUTPATIENT
Start: 2020-03-04 | End: 2020-05-14

## 2020-03-04 NOTE — PROGRESS NOTES
Anticoagulation Summary  As of 3/4/2020    INR goal:   2.0-3.0   TTR:   48.0 % (4.7 y)   INR used for dosin.80! (3/4/2020)   Warfarin maintenance plan:   2 mg (2 mg x 1) every Mon, Wed, Fri; 3 mg (3 mg x 1) all other days   Weekly warfarin total:   18 mg   Weekly max warfarin dose:   20 mg   Plan last modified:   Paras Julien, PharmD (3/4/2020)   Next INR check:   3/18/2020   Target end date:   Indefinite    Indications    Pulmonary embolism (HCC) [I26.99]             Anticoagulation Episode Summary     INR check location:   Anticoagulation Clinic    Preferred lab:       Send INR reminders to:       Comments:   *she has a non-renown PCP fax [phone]*      Anticoagulation Care Providers     Provider Role Specialty Phone number    Aron Patel M.D. Referring Internal Medicine [phone]    [facility] Responsible  [phone]        Anticoagulation Patient Findings  Patient Findings     Negatives:   Signs/symptoms of thrombosis, Signs/symptoms of bleeding, Laboratory test error suspected, Change in health, Change in alcohol use, Change in activity, Upcoming invasive procedure, Emergency department visit, Upcoming dental procedure, Missed doses, Extra doses, Change in medications, Change in diet/appetite, Hospital admission, Bruising, Other complaints          HPI:  Annamaria Jorgensen seen in clinic today, on anticoagulation therapy with warfarin due to hx of PE.  Changes to current medical/health status since last appt: NONE  Denies signs/symptoms of bleeding and/or thrombosis since the last appt.    Denies any interval changes to diet  Denies any interval changes to medications since last appt.   Denies any complications or cost restrictions with current therapy.   Verified current warfarin dosing schedule.       A/P   INR  remains sub-therapeutic.   Instructed patient to increase weekly warfarin regimen as detailed above.  Patient wished to have both 2mg and 3mg tablets for dosing,  prescription sent to preferred pharmacy.    Follow up appointment in 2 week(s).    Paras Julien, AlissaD

## 2020-03-18 ENCOUNTER — APPOINTMENT (OUTPATIENT)
Dept: VASCULAR LAB | Facility: MEDICAL CENTER | Age: 65
End: 2020-03-18
Attending: INTERNAL MEDICINE
Payer: MEDICARE

## 2020-03-18 ENCOUNTER — TELEPHONE (OUTPATIENT)
Dept: VASCULAR LAB | Facility: MEDICAL CENTER | Age: 65
End: 2020-03-18

## 2020-03-18 NOTE — TELEPHONE ENCOUNTER
[facility]     Pt did not come to appt that was scheduled for today. Will forward to MA to call to reschedule missed appt.     Evelina Gutierrez, PharmD         [facility] called for patient transfer.

## 2020-03-19 ENCOUNTER — TELEPHONE (OUTPATIENT)
Dept: VASCULAR LAB | Facility: MEDICAL CENTER | Age: 65
End: 2020-03-19

## 2020-03-20 ENCOUNTER — ANTICOAGULATION VISIT (OUTPATIENT)
Dept: VASCULAR LAB | Facility: MEDICAL CENTER | Age: 65
End: 2020-03-20
Attending: INTERNAL MEDICINE
Payer: MEDICARE

## 2020-03-20 DIAGNOSIS — Z86.711 HISTORY OF PULMONARY EMBOLISM: ICD-10-CM

## 2020-03-20 LAB
INR BLD: 1.9 (ref 0.9–1.2)
INR PPP: 1.9 (ref 2–3.5)

## 2020-03-20 PROCEDURE — 99212 OFFICE O/P EST SF 10 MIN: CPT

## 2020-03-20 PROCEDURE — 85610 PROTHROMBIN TIME: CPT

## 2020-03-20 RX ORDER — WARFARIN SODIUM 2 MG/1
TABLET ORAL
Qty: 90 TAB | Refills: 2 | Status: SHIPPED
Start: 2020-03-20 | End: 2020-10-01

## 2020-03-20 NOTE — PROGRESS NOTES
Anticoagulation Summary  As of 3/20/2020    INR goal:   2.0-3.0   TTR:   47.5 % (4.7 y)   INR used for dosin.90! (3/20/2020)   Warfarin maintenance plan:   2 mg (2 mg x 1) every Mon, Wed, Fri; 3 mg (3 mg x 1) all other days   Weekly warfarin total:   18 mg   Weekly max warfarin dose:   20 mg   Plan last modified:   Alissa NeelyD (3/4/2020)   Next INR check:   3/27/2020   Target end date:   Indefinite    Indications    Pulmonary embolism (HCC) [I26.99]             Anticoagulation Episode Summary     INR check location:   Anticoagulation Clinic    Preferred lab:       Send INR reminders to:       Comments:   *she has a non-renown PCP fax [phone]*      Anticoagulation Care Providers     Provider Role Specialty Phone number    Aron Patel M.D. Referring Internal Medicine [phone]    [facility] Responsible  [phone]        Anticoagulation Patient Findings      HPI:  Annamaria Brennen Jorgensen seen in clinic today for follow up on anticoagulation therapy in the presence of PE.   Denies any changes to current medical/health status since last appointment.   Denies any medication or diet changes.   No current symptoms of bleeding or thrombosis reported.    A/P:   INR is sub-therapeutic 1.9. Patient was confused and had been taking a decreased weekly dose as compared to calender dosing  Patient is to begin calender dosing     Follow up appointment in 1 week(s).    Next Appointment: 2020    Alissa Casillas.D

## 2020-03-27 ENCOUNTER — ANTICOAGULATION VISIT (OUTPATIENT)
Dept: VASCULAR LAB | Facility: MEDICAL CENTER | Age: 65
End: 2020-03-27
Attending: INTERNAL MEDICINE
Payer: MEDICARE

## 2020-03-27 DIAGNOSIS — Z86.711 HISTORY OF PULMONARY EMBOLISM: ICD-10-CM

## 2020-03-27 LAB
INR BLD: 2 (ref 0.9–1.2)
INR PPP: 2 (ref 2–3.5)

## 2020-03-27 PROCEDURE — 85610 PROTHROMBIN TIME: CPT

## 2020-03-27 PROCEDURE — 99211 OFF/OP EST MAY X REQ PHY/QHP: CPT

## 2020-03-27 NOTE — PROGRESS NOTES
Anticoagulation Summary  As of 3/27/2020    INR goal:   2.0-3.0   TTR:   47.3 % (4.8 y)   INR used for dosin.00 (3/27/2020)   Warfarin maintenance plan:   2 mg (2 mg x 1) every Mon, Wed, Fri; 3 mg (3 mg x 1) all other days   Weekly warfarin total:   18 mg   Weekly max warfarin dose:   20 mg   Plan last modified:   Alissa NeelyD (3/4/2020)   Next INR check:   4/10/2020   Target end date:   Indefinite    Indications    Pulmonary embolism (HCC) [I26.99]             Anticoagulation Episode Summary     INR check location:   Anticoagulation Clinic    Preferred lab:       Send INR reminders to:       Comments:   *she has a non-renown PCP fax [phone]*      Anticoagulation Care Providers     Provider Role Specialty Phone number    Aron Patel M.D. Referring Internal Medicine [phone]    [facility] Responsible  [phone]        Anticoagulation Patient Findings      HPI:  Annamaria Jorgensen seen in clinic today for follow up on anticoagulation therapy in the presence of PE.   Denies any changes to current medical/health status since last appointment.   Denies any medication or diet changes.   No current symptoms of bleeding or thrombosis reported.    A/P:   INR is therapeutic.   Continue current regimen. Patient has been trying to  Manipulate 3 mg strength to get 2 mg as she hs not been able to refill hr 2 mg strength. She will  from pharmacy this week    Follow up appointment in 2 week(s) to decrease risk of exposure to COVID-19    Next Appointment: 04/10/2020    Dimitrios Tang, Pharm.D

## 2020-04-09 DIAGNOSIS — F33.0 MILD EPISODE OF RECURRENT MAJOR DEPRESSIVE DISORDER (HCC): ICD-10-CM

## 2020-04-09 RX ORDER — BUPROPION HYDROCHLORIDE 300 MG/1
300 TABLET ORAL EVERY MORNING
Qty: 14 TAB | Refills: 0 | Status: SHIPPED | OUTPATIENT
Start: 2020-04-09 | End: 2020-04-13 | Stop reason: SDUPTHER

## 2020-04-10 ENCOUNTER — ANTICOAGULATION VISIT (OUTPATIENT)
Dept: VASCULAR LAB | Facility: MEDICAL CENTER | Age: 65
End: 2020-04-10
Attending: INTERNAL MEDICINE
Payer: MEDICARE

## 2020-04-10 DIAGNOSIS — F33.0 MILD EPISODE OF RECURRENT MAJOR DEPRESSIVE DISORDER (HCC): ICD-10-CM

## 2020-04-10 DIAGNOSIS — Z86.711 HISTORY OF PULMONARY EMBOLISM: ICD-10-CM

## 2020-04-10 LAB
INR BLD: 1.5 (ref 0.9–1.2)
INR PPP: 1.5 (ref 2–3.5)

## 2020-04-10 PROCEDURE — 85610 PROTHROMBIN TIME: CPT

## 2020-04-10 PROCEDURE — 99212 OFFICE O/P EST SF 10 MIN: CPT

## 2020-04-10 RX ORDER — BUPROPION HYDROCHLORIDE 300 MG/1
300 TABLET ORAL EVERY MORNING
Qty: 14 TAB | Refills: 0 | OUTPATIENT
Start: 2020-04-10

## 2020-04-10 NOTE — PATIENT INSTRUCTIONS
You will be seen at our drive thru service at [facility] located on the west side of the building at  [address] [place] [address].  [place] [address]         Please call [phone] with any questions.

## 2020-04-10 NOTE — PROGRESS NOTES
Anticoagulation Summary  As of 4/10/2020    INR goal:   2.0-3.0   TTR:   46.9 % (4.8 y)   INR used for dosin.50! (4/10/2020)   Warfarin maintenance plan:   2 mg (2 mg x 1) every Mon, Fri; 3 mg (3 mg x 1) all other days   Weekly warfarin total:   19 mg   Weekly max warfarin dose:   20 mg   Plan last modified:   Dimitrios Tang PharmD (4/10/2020)   Next INR check:   2020   Target end date:   Indefinite    Indications    Pulmonary embolism (HCC) [I26.99]             Anticoagulation Episode Summary     INR check location:   Anticoagulation Clinic    Preferred lab:       Send INR reminders to:       Comments:   *she has a non-renown PCP fax [phone]*      Anticoagulation Care Providers     Provider Role Specialty Phone number    Aron Patel M.D. Referring Internal Medicine [phone]    [facility] Responsible  [phone]        Anticoagulation Patient Findings  Patient Findings     Positives:   Change in diet/appetite          HPI:  Annamaria Jorgensen seen in clinic today for follow up on anticoagulation therapy in the presence of PE.   Denies any changes to current medical/health status since last appointment.   Denies any medication or diet changes.   No current symptoms of bleeding or thrombosis reported.    A/P:   INR is sub-therapeutic.   Continue current regimen.     Follow up appointment in 2 week(s). Discussed with patient that next visit will be a car visit, located at [address] on the west side of the building    Next Appointment: 2020    Courtney CasillasD

## 2020-04-13 DIAGNOSIS — F33.0 MILD EPISODE OF RECURRENT MAJOR DEPRESSIVE DISORDER (HCC): ICD-10-CM

## 2020-04-13 RX ORDER — BUPROPION HYDROCHLORIDE 300 MG/1
300 TABLET ORAL EVERY MORNING
Qty: 14 TAB | Refills: 0 | OUTPATIENT
Start: 2020-04-13

## 2020-04-13 RX ORDER — BUPROPION HYDROCHLORIDE 300 MG/1
300 TABLET ORAL EVERY MORNING
Qty: 14 TAB | Refills: 0 | Status: SHIPPED | OUTPATIENT
Start: 2020-04-13 | End: 2020-05-01 | Stop reason: SDUPTHER

## 2020-04-13 NOTE — TELEPHONE ENCOUNTER
She said she didn't  the other prescription you sent and if you can send it to [facility].  She's aware she needs an appointment

## 2020-04-24 ENCOUNTER — ANTICOAGULATION VISIT (OUTPATIENT)
Dept: VASCULAR LAB | Facility: MEDICAL CENTER | Age: 65
End: 2020-04-24
Attending: INTERNAL MEDICINE
Payer: MEDICARE

## 2020-04-24 DIAGNOSIS — Z86.711 HISTORY OF PULMONARY EMBOLISM: ICD-10-CM

## 2020-04-24 LAB — INR PPP: 2.1 (ref 2–3.5)

## 2020-04-24 PROCEDURE — 99211 OFF/OP EST MAY X REQ PHY/QHP: CPT

## 2020-04-24 PROCEDURE — 85610 PROTHROMBIN TIME: CPT

## 2020-04-24 NOTE — PROGRESS NOTES
Anticoagulation Summary  As of 2020    INR goal:   2.0-3.0   TTR:   46.7 % (4.8 y)   INR used for dosin.10 (2020)   Warfarin maintenance plan:   2 mg (2 mg x 1) every Mon, Fri; 3 mg (3 mg x 1) all other days   Weekly warfarin total:   19 mg   Weekly max warfarin dose:   20 mg   Plan last modified:   Dimitrios Tang PharmD (4/10/2020)   Next INR check:   2020   Target end date:   Indefinite    Indications    Pulmonary embolism (HCC) [I26.99]             Anticoagulation Episode Summary     INR check location:   Anticoagulation Clinic    Preferred lab:       Send INR reminders to:       Comments:   *she has a non-renown PCP fax [phone]*      Anticoagulation Care Providers     Provider Role Specialty Phone number    Aron Patel M.D. Referring Internal Medicine [phone]    [facility] Responsible  [phone]        Anticoagulation Patient Findings      HPI:  Annamaria Jorgensen seen in clinic today for follow up on anticoagulation therapy in the presence of PE.   Denies any changes to current medical/health status since last appointment.   Denies any medication or diet changes.   No current symptoms of bleeding or thrombosis reported.    A/P:   INR is therapeutic.   Continue current regimen.     Follow up appointment in 2 week(s).      Alissa Casillas.D

## 2020-05-01 ENCOUNTER — OFFICE VISIT (OUTPATIENT)
Dept: MEDICAL GROUP | Facility: MEDICAL CENTER | Age: 65
End: 2020-05-01
Payer: MEDICARE

## 2020-05-01 VITALS
HEIGHT: 65 IN | HEART RATE: 81 BPM | RESPIRATION RATE: 16 BRPM | OXYGEN SATURATION: 96 % | WEIGHT: 164 LBS | DIASTOLIC BLOOD PRESSURE: 70 MMHG | SYSTOLIC BLOOD PRESSURE: 130 MMHG | BODY MASS INDEX: 27.32 KG/M2

## 2020-05-01 DIAGNOSIS — Z12.31 ENCOUNTER FOR SCREENING MAMMOGRAM FOR BREAST CANCER: ICD-10-CM

## 2020-05-01 DIAGNOSIS — Z11.59 NEED FOR HEPATITIS C SCREENING TEST: ICD-10-CM

## 2020-05-01 DIAGNOSIS — Z79.01 CHRONIC ANTICOAGULATION: ICD-10-CM

## 2020-05-01 DIAGNOSIS — Z12.11 SCREEN FOR COLON CANCER: ICD-10-CM

## 2020-05-01 DIAGNOSIS — R73.01 IMPAIRED FASTING GLUCOSE: ICD-10-CM

## 2020-05-01 DIAGNOSIS — F33.0 MILD EPISODE OF RECURRENT MAJOR DEPRESSIVE DISORDER (HCC): ICD-10-CM

## 2020-05-01 DIAGNOSIS — Z23 NEED FOR PNEUMOCOCCAL VACCINE: ICD-10-CM

## 2020-05-01 DIAGNOSIS — Z13.6 SCREENING FOR CARDIOVASCULAR CONDITION: ICD-10-CM

## 2020-05-01 PROCEDURE — 90732 PPSV23 VACC 2 YRS+ SUBQ/IM: CPT | Performed by: NURSE PRACTITIONER

## 2020-05-01 PROCEDURE — G0009 ADMIN PNEUMOCOCCAL VACCINE: HCPCS | Performed by: NURSE PRACTITIONER

## 2020-05-01 PROCEDURE — 99213 OFFICE O/P EST LOW 20 MIN: CPT | Mod: 25 | Performed by: NURSE PRACTITIONER

## 2020-05-01 RX ORDER — BUPROPION HYDROCHLORIDE 300 MG/1
300 TABLET ORAL EVERY MORNING
Qty: 90 TAB | Refills: 3 | Status: SHIPPED | OUTPATIENT
Start: 2020-05-01 | End: 2021-04-26

## 2020-05-01 ASSESSMENT — FIBROSIS 4 INDEX: FIB4 SCORE: 1.28

## 2020-05-01 ASSESSMENT — ENCOUNTER SYMPTOMS: DEPRESSION: 1

## 2020-05-01 NOTE — PROGRESS NOTES

## 2020-05-01 NOTE — PROGRESS NOTES
Subjective:      Annamaria Jorgensen is a 65 y.o. female who presents with Medication Refill (wellbutrin)        CC: Patient here today because she was advised she needed to come in at least yearly for follow-up on her problems including depression, impaired fasting glucose and chronic anticoagulation.    HPI       1. Mild episode of recurrent major depressive disorder (HCC)  Patient would like to get a refill on her Wellbutrin 300 mg XL which she takes daily for depression.  She states she does not feel she needs counseling and medicine is adequate.    2. Impaired fasting glucose  Previous hemoglobin A1c from 2018 was elevated at 5.8 and she is due for recheck.  She denies diabetic symptoms    3. Chronic anticoagulation  Patient continues on Coumadin through the Coumadin clinic for her history of pulmonary embolism.  She reports no problems with breathing, tachycardia or hemoptysis.    4. Screening for cardiovascular condition  Patient due for cholesterol screening    5. Need for hepatitis C screening test  Patient due for one-time hepatitis C screen    6. Encounter for screening mammogram for breast cancer  Patient overdue for mammogram    7. Screen for colon cancer  Patient due for colonoscopy    8. 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   • Carpal tunnel syndrome on both sides     With corrective sx in 1992 and 1993   • CATARACT    • Depression    • Fibromyalgia    • Frequent UTI    • Heart valve disease    • Hiatus hernia syndrome    • Indigestion    • Jaundice     After gallbladder was removed   • Pain     fibromyalgia; scoliosis   • Palpitations    • Pulmonary embolism (HCC)    • Pulmonary embolism (HCC) 4/30/2015   • S/P  tonsillectomy    • Scoliosis    • Sleep apnea     Uses CPAP   • Unspecified hemorrhagic conditions     on coumadin   • Unspecified urinary incontinence    • Urinary bladder disorder      Social History     Socioeconomic History   • Marital status:      Spouse name: Not on file   • Number of children: Not on file   • Years of education: Not on file   • Highest education level: Not on file   Occupational History   • Not on file   Social Needs   • Financial resource strain: Not on file   • Food insecurity     Worry: Not on file     Inability: Not on file   • Transportation needs     Medical: Not on file     Non-medical: Not on file   Tobacco Use   • Smoking status: Never Smoker   • Smokeless tobacco: Never Used   Substance and Sexual Activity   • Alcohol use: Yes     Comment: RARELY   • Drug use: No   • Sexual activity: Not Currently   Lifestyle   • Physical activity     Days per week: Not on file     Minutes per session: Not on file   • Stress: Not on file   Relationships   • Social connections     Talks on phone: Not on file     Gets together: Not on file     Attends Christian service: Not on file     Active member of club or organization: Not on file     Attends meetings of clubs or organizations: Not on file     Relationship status: Not on file   • Intimate partner violence     Fear of current or ex partner: Not on file     Emotionally abused: Not on file     Physically abused: Not on file     Forced sexual activity: Not on file   Other Topics Concern   • Not on file   Social History Narrative   • Not on file     Current Outpatient Medications   Medication Sig Dispense Refill   • buPROPion (WELLBUTRIN XL) 300 MG XL tablet Take 1 Tab by mouth every morning. 90 Tab 3   • warfarin (COUMADIN) 2 MG Tab TAKE ONE TABLETS ORALLY ONCE DAILY AS DIRECTED BY ANTICOAGULATION SERVICE to take with 3 mg 90 Tab 2   • warfarin (COUMADIN) 3 MG Tab Take one tablet by mouth daily or as directed by coumadin clinic 30 Tab 2     No  "current facility-administered medications for this visit.      Family History   Problem Relation Age of Onset   • Stroke Maternal Grandmother    • Stroke Mother    • Cancer Father    • Lung Disease Father        Review of Systems   Psychiatric/Behavioral: Positive for depression.   All other systems reviewed and are negative.         Objective:     /70 (BP Location: Left arm, Patient Position: Sitting, BP Cuff Size: Adult)   Pulse 81   Resp 16   Ht 1.651 m (5' 5\")   Wt 74.4 kg (164 lb)   SpO2 96%   BMI 27.29 kg/m²      Physical Exam  Vitals signs and nursing note reviewed.   Constitutional:       General: She is not in acute distress.     Appearance: She is well-developed. She is not diaphoretic.   HENT:      Head: Normocephalic and atraumatic.      Right Ear: External ear normal.      Left Ear: External ear normal.      Nose: Nose normal.   Eyes:      General:         Right eye: No discharge.         Left eye: No discharge.   Neck:      Musculoskeletal: Normal range of motion and neck supple.      Thyroid: No thyromegaly.   Cardiovascular:      Rate and Rhythm: Normal rate and regular rhythm.      Heart sounds: Normal heart sounds. No murmur. No friction rub. No gallop.    Pulmonary:      Effort: Pulmonary effort is normal.      Breath sounds: Normal breath sounds. No wheezing or rales.   Musculoskeletal:         General: No tenderness.   Skin:     General: Skin is warm and dry.      Findings: No rash.   Neurological:      Mental Status: She is alert and oriented to person, place, and time.      Deep Tendon Reflexes: Reflexes normal.   Psychiatric:         Behavior: Behavior normal.         Thought Content: Thought content normal.         Judgment: Judgment normal.                 Assessment/Plan:       1. Mild episode of recurrent major depressive disorder (HCC)  I have refilled patient's medicines for the year and she declines counseling.  I did remind her that she has to come in yearly or I cannot " continue to prescribe her medicines on a regular basis.  - buPROPion (WELLBUTRIN XL) 300 MG XL tablet; Take 1 Tab by mouth every morning.  Dispense: 90 Tab; Refill: 3    2. Impaired fasting glucose  I advised lab work to be sure she is still not a type II diabetic and possibly only still prediabetic  - Comp Metabolic Panel; Future  - CBC WITHOUT DIFFERENTIAL; Future    3. Chronic anticoagulation  Patient will continue to follow with the Coumadin clinic which is prescribing her Coumadin and doing INR testing for her history of pulmonary embolism    4. Screening for cardiovascular condition  - Lipid Profile; Future    5. Need for hepatitis C screening test    - HEP C VIRUS ANTIBODY; Future    6. Encounter for screening mammogram for breast cancer    - MA-SCREENING MAMMO BILAT W/CAD; Future    7. Screen for colon cancer    - REFERRAL TO GI FOR COLONOSCOPY    8. Need for pneumococcal vaccine  I have placed the below orders and discussed them with an approved delegating provider. The MA is performing the below orders under the direction of Dr. Mane    - Pneumococal Polysaccharide Vaccine 23-Valent =>3YO SQ/IM

## 2020-05-08 ENCOUNTER — ANTICOAGULATION VISIT (OUTPATIENT)
Dept: VASCULAR LAB | Facility: MEDICAL CENTER | Age: 65
End: 2020-05-08
Attending: INTERNAL MEDICINE
Payer: MEDICARE

## 2020-05-08 DIAGNOSIS — Z86.711 HISTORY OF PULMONARY EMBOLISM: ICD-10-CM

## 2020-05-08 LAB — INR PPP: 2.2 (ref 2–3.5)

## 2020-05-08 PROCEDURE — 99211 OFF/OP EST MAY X REQ PHY/QHP: CPT

## 2020-05-08 PROCEDURE — 85610 PROTHROMBIN TIME: CPT

## 2020-05-08 NOTE — PROGRESS NOTES
Anticoagulation Summary  As of 2020    INR goal:   2.0-3.0   TTR:   47.1 % (4.9 y)   INR used for dosin.20 (2020)   Warfarin maintenance plan:   2 mg (2 mg x 1) every Mon, Fri; 3 mg (3 mg x 1) all other days   Weekly warfarin total:   19 mg   Weekly max warfarin dose:   20 mg   Plan last modified:   Dimitrios Tang PharmD (4/10/2020)   Next INR check:   2020   Target end date:   Indefinite    Indications    Pulmonary embolism (HCC) [I26.99]             Anticoagulation Episode Summary     INR check location:   Anticoagulation Clinic    Preferred lab:       Send INR reminders to:       Comments:   *she has a non-renown PCP fax [phone]*      Anticoagulation Care Providers     Provider Role Specialty Phone number    Aron Patel M.D. Referring Internal Medicine [phone]    [facility] Responsible  [phone]        Anticoagulation Patient Findings      HPI:  Annamaria Jorgensen seen in clinic today for follow up on anticoagulation therapy in the presence of PE.   Denies any changes to current medical/health status since last appointment.   Denies any medication or diet changes.   No current symptoms of bleeding or thrombosis reported.    A/P:   INR is therapeutic.   Continue current regimen.     Follow up appointment in 4 week(s).    Alissa Casillas.D

## 2020-05-14 DIAGNOSIS — I26.99 OTHER PULMONARY EMBOLISM WITHOUT ACUTE COR PULMONALE, UNSPECIFIED CHRONICITY (HCC): ICD-10-CM

## 2020-05-14 RX ORDER — WARFARIN SODIUM 3 MG/1
TABLET ORAL
Qty: 90 TAB | Refills: 1 | Status: SHIPPED | OUTPATIENT
Start: 2020-05-14 | End: 2020-10-01 | Stop reason: SDUPTHER

## 2020-06-05 ENCOUNTER — TELEPHONE (OUTPATIENT)
Dept: VASCULAR LAB | Facility: MEDICAL CENTER | Age: 65
End: 2020-06-05

## 2020-06-05 NOTE — TELEPHONE ENCOUNTER
Pt NO SHOWED his appointment for anticoagulation services.  Routed to MA to reschedule  Dimitrios Tang, Alissa.D

## 2020-06-10 ENCOUNTER — TELEPHONE (OUTPATIENT)
Dept: VASCULAR LAB | Facility: MEDICAL CENTER | Age: 65
End: 2020-06-10

## 2020-06-15 ENCOUNTER — HOSPITAL ENCOUNTER (OUTPATIENT)
Dept: RADIOLOGY | Facility: MEDICAL CENTER | Age: 65
End: 2020-06-15
Attending: NURSE PRACTITIONER
Payer: MEDICARE

## 2020-06-15 DIAGNOSIS — Z12.31 ENCOUNTER FOR SCREENING MAMMOGRAM FOR BREAST CANCER: ICD-10-CM

## 2020-06-15 PROCEDURE — 77067 SCR MAMMO BI INCL CAD: CPT

## 2020-06-17 ENCOUNTER — TELEPHONE (OUTPATIENT)
Dept: VASCULAR LAB | Facility: MEDICAL CENTER | Age: 65
End: 2020-06-17

## 2020-06-24 ENCOUNTER — TELEPHONE (OUTPATIENT)
Dept: VASCULAR LAB | Facility: MEDICAL CENTER | Age: 65
End: 2020-06-24

## 2020-07-16 ENCOUNTER — TELEPHONE (OUTPATIENT)
Dept: VASCULAR LAB | Facility: MEDICAL CENTER | Age: 65
End: 2020-07-16

## 2020-07-24 ENCOUNTER — ANTICOAGULATION VISIT (OUTPATIENT)
Dept: VASCULAR LAB | Facility: MEDICAL CENTER | Age: 65
End: 2020-07-24
Attending: INTERNAL MEDICINE
Payer: MEDICARE

## 2020-07-24 VITALS — SYSTOLIC BLOOD PRESSURE: 109 MMHG | DIASTOLIC BLOOD PRESSURE: 53 MMHG | HEART RATE: 80 BPM

## 2020-07-24 DIAGNOSIS — Z79.01 CHRONIC ANTICOAGULATION: ICD-10-CM

## 2020-07-24 LAB — INR PPP: 1.8 (ref 2–3.5)

## 2020-07-24 PROCEDURE — 99212 OFFICE O/P EST SF 10 MIN: CPT | Performed by: PHARMACIST

## 2020-07-24 PROCEDURE — 85610 PROTHROMBIN TIME: CPT

## 2020-07-24 NOTE — PROGRESS NOTES
Anticoagulation Summary  As of 2020    INR goal:   2.0-3.0   TTR:   47.2 % (5.1 y)   INR used for dosin.80! (2020)   Warfarin maintenance plan:   2 mg (2 mg x 1) every Mon, Fri; 3 mg (3 mg x 1) all other days   Weekly warfarin total:   19 mg   Weekly max warfarin dose:   20 mg   Plan last modified:   Alissa HammerD (4/10/2020)   Next INR check:   2020   Target end date:   Indefinite    Indications    Pulmonary embolism (HCC) [I26.99]             Anticoagulation Episode Summary     INR check location:   Anticoagulation Clinic    Preferred lab:       Send INR reminders to:       Comments:   *she has a non-renown PCP fax [phone]*      Anticoagulation Care Providers     Provider Role Specialty Phone number    Aron Patel M.D. Referring Internal Medicine [phone]    [facility] Responsible  [phone]                Anticoagulation Patient Findings      HPI:  Annamaria Hutton Jameel seen in clinic today, on anticoagulation therapy with warfarin for PE  Changes to current medical/health status since last appt: pt accidentally took 2mg last night instead of her normal 3mg dose.   Denies signs/symptoms of bleeding and/or thrombosis since the last appt.    Denies any interval changes to diet  Denies any interval changes to medications since last appt.   Denies any complications or cost restrictions with current therapy.   Verified current warfarin dosing schedule.   BP recorded in vitals.      A/P   INR  is sub-therapeutic.   Will have pt take a boost warfarin dose today of 3mg x1 and then continue with her normal warfarin dosing    Follow up appointment in 3 week(s).    Evelina Gutierrez, PharmD

## 2020-08-14 ENCOUNTER — ANTICOAGULATION VISIT (OUTPATIENT)
Dept: VASCULAR LAB | Facility: MEDICAL CENTER | Age: 65
End: 2020-08-14
Attending: INTERNAL MEDICINE
Payer: MEDICARE

## 2020-08-14 DIAGNOSIS — Z79.01 CHRONIC ANTICOAGULATION: ICD-10-CM

## 2020-08-14 LAB — INR PPP: 2 (ref 2–3.5)

## 2020-08-14 PROCEDURE — 85610 PROTHROMBIN TIME: CPT

## 2020-08-14 PROCEDURE — 99211 OFF/OP EST MAY X REQ PHY/QHP: CPT

## 2020-08-14 NOTE — PROGRESS NOTES
Anticoagulation Summary  As of 2020    INR goal:  2.0-3.0   TTR:  46.7 % (5.1 y)   INR used for dosin.00 (2020)   Warfarin maintenance plan:  2 mg (2 mg x 1) every Mon, Fri; 3 mg (3 mg x 1) all other days   Weekly warfarin total:  19 mg   Weekly max warfarin dose:  20 mg   Plan last modified:  Dimitrios Tang PharmD (4/10/2020)   Next INR check:  9/3/2020   Target end date:  Indefinite    Indications    Pulmonary embolism (HCC) [I26.99]             Anticoagulation Episode Summary     INR check location:  Anticoagulation Clinic    Preferred lab:      Send INR reminders to:      Comments:  *she has a non-renown PCP fax [phone]*      Anticoagulation Care Providers     Provider Role Specialty Phone number    Aron Patel M.D. Referring Internal Medicine [phone]    [facility] Responsible  [phone]        Anticoagulation Patient Findings      HPI:  Annamaria Jorgensen seen in clinic today for follow up on anticoagulation therapy in the presence of PE.   Denies any changes to current medical/health status since last appointment.   Denies any medication or diet changes.   No current symptoms of bleeding or thrombosis reported.    A/P:   INR is therapeutic.   Continue current regimen.     Follow up appointment in 3 week(s).      Alissa Casillas.D

## 2020-08-17 LAB — INR BLD: 2 (ref 0.9–1.2)

## 2020-09-03 ENCOUNTER — ANTICOAGULATION VISIT (OUTPATIENT)
Dept: VASCULAR LAB | Facility: MEDICAL CENTER | Age: 65
End: 2020-09-03
Attending: INTERNAL MEDICINE
Payer: MEDICARE

## 2020-09-03 ENCOUNTER — APPOINTMENT (OUTPATIENT)
Dept: VASCULAR LAB | Facility: MEDICAL CENTER | Age: 65
End: 2020-09-03
Payer: MEDICARE

## 2020-09-03 DIAGNOSIS — Z79.01 CHRONIC ANTICOAGULATION: ICD-10-CM

## 2020-09-03 LAB
INR BLD: 1.5 (ref 0.9–1.2)
INR PPP: 1.5 (ref 2–3.5)

## 2020-09-03 PROCEDURE — 85610 PROTHROMBIN TIME: CPT

## 2020-09-03 PROCEDURE — 99212 OFFICE O/P EST SF 10 MIN: CPT | Performed by: PHARMACIST

## 2020-09-03 NOTE — PROGRESS NOTES
Anticoagulation Summary  As of 9/3/2020    INR goal:  2.0-3.0   TTR:  46.2 % (5.2 y)   INR used for dosin.50 (9/3/2020)   Warfarin maintenance plan:  3 mg (3 mg x 1) every day   Weekly warfarin total:  21 mg   Weekly max warfarin dose:  20 mg   Plan last modified:  Evelina Gutierrez, [place] (9/3/2020)   Next INR check:  2020   Target end date:  Indefinite    Indications    Pulmonary embolism (HCC) [I26.99]             Anticoagulation Episode Summary     INR check location:  Anticoagulation Clinic    Preferred lab:      Send INR reminders to:      Comments:  *she has a non-renown PCP fax [phone]*      Anticoagulation Care Providers     Provider Role Specialty Phone number    Aron Patel M.D. Referring Internal Medicine [phone]    [facility] Responsible  [phone]                Anticoagulation Patient Findings      HPI:  Annamaria Jorgensen seen in clinic today, on anticoagulation therapy with warfarin for PE  Changes to current medical/health status since last appt: denies  Denies signs/symptoms of bleeding and/or thrombosis since the last appt.    Denies any interval changes to diet  Denies any interval changes to medications since last appt.   Denies any complications or cost restrictions with current therapy.   Verified current warfarin dosing schedule.   BP declined      A/P   INR  is sub-therapeutic.   Will have pt take a boost warfarin dose of 4mg x1 today and then start a 10% increased weekly dose.     Follow up appointment in 2 week(s).    Evelina Gutierrez, PharmD

## 2020-09-17 ENCOUNTER — ANTICOAGULATION VISIT (OUTPATIENT)
Dept: VASCULAR LAB | Facility: MEDICAL CENTER | Age: 65
End: 2020-09-17
Attending: INTERNAL MEDICINE
Payer: MEDICARE

## 2020-09-17 DIAGNOSIS — Z79.01 CHRONIC ANTICOAGULATION: ICD-10-CM

## 2020-09-17 LAB — INR PPP: 2.6 (ref 2–3.5)

## 2020-09-17 PROCEDURE — 99211 OFF/OP EST MAY X REQ PHY/QHP: CPT

## 2020-09-17 PROCEDURE — 85610 PROTHROMBIN TIME: CPT

## 2020-09-17 NOTE — PROGRESS NOTES
Anticoagulation Summary  As of 2020    INR goal:  2.0-3.0   TTR:  46.3 % (5.2 y)   INR used for dosin.60 (2020)   Warfarin maintenance plan:  3 mg (3 mg x 1) every day   Weekly warfarin total:  21 mg   Weekly max warfarin dose:  20 mg   Plan last modified:  Evelina Gutierrez PharmD (9/3/2020)   Next INR check:  10/1/2020   Target end date:  Indefinite    Indications    Pulmonary embolism (HCC) [I26.99]             Anticoagulation Episode Summary     INR check location:  Anticoagulation Clinic    Preferred lab:      Send INR reminders to:      Comments:  *she has a non-renown PCP fax [phone]*      Anticoagulation Care Providers     Provider Role Specialty Phone number    Aron Patel M.D. Referring Internal Medicine [phone]    [facility] Responsible  [phone]        Anticoagulation Patient Findings      HPI:  Annamaria Jorgensen seen in clinic today, on anticoagulation therapy with warfarin for hx of PE  Changes to current medical/health status since last appt: None  Denies signs/symptoms of bleeding and/or thrombosis since the last appt.    Denies any interval changes to diet.  Denies any medication changes.  Denies any complications or cost restrictions with current therapy.   BP declined.    A/P   INR therapeutic.   Instructed pt to continue on with current regimen.    Follow up appointment in 2 week(s).    Deep Fitzgerald PharmD

## 2020-09-21 LAB — INR BLD: 2.6 (ref 0.9–1.2)

## 2020-10-01 ENCOUNTER — ANTICOAGULATION VISIT (OUTPATIENT)
Dept: VASCULAR LAB | Facility: MEDICAL CENTER | Age: 65
End: 2020-10-01
Attending: INTERNAL MEDICINE
Payer: MEDICARE

## 2020-10-01 VITALS — SYSTOLIC BLOOD PRESSURE: 129 MMHG | HEART RATE: 73 BPM | DIASTOLIC BLOOD PRESSURE: 68 MMHG

## 2020-10-01 DIAGNOSIS — I26.99 OTHER PULMONARY EMBOLISM WITHOUT ACUTE COR PULMONALE, UNSPECIFIED CHRONICITY (HCC): ICD-10-CM

## 2020-10-01 LAB — INR PPP: 4.5 (ref 2–3.5)

## 2020-10-01 PROCEDURE — 85610 PROTHROMBIN TIME: CPT

## 2020-10-01 PROCEDURE — 99212 OFFICE O/P EST SF 10 MIN: CPT

## 2020-10-01 RX ORDER — WARFARIN SODIUM 3 MG/1
TABLET ORAL
Qty: 90 TAB | Refills: 1 | Status: SHIPPED | OUTPATIENT
Start: 2020-10-01 | End: 2021-03-24

## 2020-10-01 NOTE — PROGRESS NOTES
Anticoagulation Summary  As of 10/1/2020    INR goal:  2.0-3.0   TTR:  46.1 % (5.3 y)   INR used for dosin.50 (10/1/2020)   Warfarin maintenance plan:  3 mg (3 mg x 1) every day   Weekly warfarin total:  21 mg   Weekly max warfarin dose:  20 mg   Plan last modified:  Evelina Gutierrez, PharmD (9/3/2020)   Next INR check:  10/15/2020   Target end date:  Indefinite    Indications    Pulmonary embolism (HCC) [I26.99]             Anticoagulation Episode Summary     INR check location:  Anticoagulation Clinic    Preferred lab:      Send INR reminders to:      Comments:  *she has a non-renown PCP fax [phone]*      Anticoagulation Care Providers     Provider Role Specialty Phone number    Aron Patel M.D. Referring Internal Medicine [phone]    [facility] Responsible  [phone]                Anticoagulation Patient Findings  Patient Findings     Positives:  Extra doses    Negatives:  Signs/symptoms of thrombosis, Signs/symptoms of bleeding, Laboratory test error suspected, Change in health, Change in alcohol use, Change in activity, Upcoming invasive procedure, Emergency department visit, Upcoming dental procedure, Missed doses, Change in medications, Change in diet/appetite, Hospital admission, Bruising, Other complaints          HPI:  Annamaria Jorgensen seen in clinic today, on anticoagulation therapy with warfarin for PE  Changes to current medical/health status since last appt: patient reports she may have taken extra doses of her warfarin  Denies signs/symptoms of bleeding and/or thrombosis since the last appt.    Denies any interval changes to diet  Denies any interval changes to medications since last appt.   Denies any complications or cost restrictions with current therapy.   Verified current warfarin dosing schedule.   BP recorded in vitals.       A/P   INR  supra-therapeutic.   Instructed patient to hold one dose today and then resume current dosing regimen as detailed  above    Follow up appointment in 2 week(s).    Carisa Gliman, PharmD   Idania Alonso, Pharmacy Intern

## 2020-10-05 LAB — INR BLD: 4.5 (ref 0.9–1.2)

## 2020-10-15 ENCOUNTER — ANTICOAGULATION VISIT (OUTPATIENT)
Dept: VASCULAR LAB | Facility: MEDICAL CENTER | Age: 65
End: 2020-10-15
Attending: INTERNAL MEDICINE
Payer: MEDICARE

## 2020-10-15 VITALS
SYSTOLIC BLOOD PRESSURE: 123 MMHG | WEIGHT: 169 LBS | DIASTOLIC BLOOD PRESSURE: 57 MMHG | HEART RATE: 84 BPM | BODY MASS INDEX: 28.12 KG/M2

## 2020-10-15 DIAGNOSIS — Z79.01 CHRONIC ANTICOAGULATION: ICD-10-CM

## 2020-10-15 LAB — INR PPP: 1.9 (ref 2–3.5)

## 2020-10-15 PROCEDURE — 85610 PROTHROMBIN TIME: CPT

## 2020-10-15 PROCEDURE — 99212 OFFICE O/P EST SF 10 MIN: CPT

## 2020-10-15 ASSESSMENT — FIBROSIS 4 INDEX: FIB4 SCORE: 1.28

## 2020-10-15 NOTE — PROGRESS NOTES
Anticoagulation Summary  As of 10/15/2020    INR goal:  2.0-3.0   TTR:  46.1 % (5.3 y)   INR used for dosing:     Warfarin maintenance plan:  3 mg (3 mg x 1) every day   Weekly warfarin total:  21 mg   Weekly max warfarin dose:  20 mg   Plan last modified:  Evelina Gutierrez, PharmD (9/3/2020)   Next INR check:     Target end date:  Indefinite    Indications    Pulmonary embolism (HCC) [I26.99]             Anticoagulation Episode Summary     INR check location:  Anticoagulation Clinic    Preferred lab:      Send INR reminders to:      Comments:  *she has a non-renown PCP fax [phone]*      Anticoagulation Care Providers     Provider Role Specialty Phone number    Aron Patel M.D. Referring Internal Medicine [phone]    [facility] Responsible  [phone]        Anticoagulation Patient Findings  Patient Findings     Negatives:  Signs/symptoms of thrombosis, Signs/symptoms of bleeding, Laboratory test error suspected, Change in health, Change in alcohol use, Change in activity, Upcoming invasive procedure, Emergency department visit, Upcoming dental procedure, Missed doses, Extra doses, Change in medications, Change in diet/appetite, Hospital admission, Bruising, Other complaints            History of Present Illness: follow up appointment for chronic anticoagulation with the high risk medication, warfarin for PE    Last INR was out of range, dosage adjusted: pt is now sub therapeutic today.  Will increase weekly dose by 7%. Follow up in 2 weeks, to reduce the risk of adverse events related to this high risk medication, warfarin.      Pt declines vitals today    Annamaria Lopez Clinical Pharmacist      [facility]

## 2020-10-20 LAB — INR BLD: 1.9 (ref 0.9–1.2)

## 2020-10-29 ENCOUNTER — ANTICOAGULATION VISIT (OUTPATIENT)
Dept: VASCULAR LAB | Facility: MEDICAL CENTER | Age: 65
End: 2020-10-29
Attending: INTERNAL MEDICINE
Payer: MEDICARE

## 2020-10-29 DIAGNOSIS — Z79.01 CHRONIC ANTICOAGULATION: ICD-10-CM

## 2020-10-29 LAB
INR BLD: 3.3 (ref 0.9–1.2)
INR PPP: 3.3 (ref 2–3.5)

## 2020-10-29 PROCEDURE — 85610 PROTHROMBIN TIME: CPT

## 2020-10-29 PROCEDURE — 99212 OFFICE O/P EST SF 10 MIN: CPT

## 2020-10-29 NOTE — PROGRESS NOTES
Anticoagulation Summary  As of 10/29/2020    INR goal:  2.0-3.0   TTR:  46.2 % (5.3 y)   INR used for dosing:  3.30 (10/29/2020)   Warfarin maintenance plan:  4.5 mg (3 mg x 1.5) every Wed; 3 mg (3 mg x 1) all other days   Weekly warfarin total:  22.5 mg   Plan last modified:  Deep Fitzgerald PharmD (10/29/2020)   Next INR check:  11/12/2020   Target end date:  Indefinite    Indications    Pulmonary embolism (HCC) [I26.99]             Anticoagulation Episode Summary     INR check location:  Anticoagulation Clinic    Preferred lab:      Send INR reminders to:      Comments:  *she has a non-renown PCP fax [phone]*      Anticoagulation Care Providers     Provider Role Specialty Phone number    Aron Patel M.D. Referring Internal Medicine [phone]    [facility] Responsible  [phone]        Anticoagulation Patient Findings      HPI:  Annamaria Jorgensen seen in clinic today, on anticoagulation therapy with warfarin for hx of PE  Changes to current medical/health status since last appt: None  Denies signs/symptoms of bleeding and/or thrombosis since the last appt.    Pt states her dietary greens consumption varies - counseled on consistency.  Denies any interval changes to medications since last appt.   Denies any complications or cost restrictions with current therapy.   BP declined.    A/P   INR  SUPRA-therapeutic.   Instructed pt to take a decreased dose of 1.5 mg x 1 and then continue on with current regimen.    Follow up appointment in 2 week(s).    Deep Fitzgerald PharmD

## 2020-11-12 ENCOUNTER — ANTICOAGULATION VISIT (OUTPATIENT)
Dept: VASCULAR LAB | Facility: MEDICAL CENTER | Age: 65
End: 2020-11-12
Attending: INTERNAL MEDICINE
Payer: MEDICARE

## 2020-11-12 DIAGNOSIS — I26.99 OTHER PULMONARY EMBOLISM WITHOUT ACUTE COR PULMONALE, UNSPECIFIED CHRONICITY (HCC): ICD-10-CM

## 2020-11-12 LAB
INR BLD: 2.9 (ref 0.9–1.2)
INR PPP: 2.9 (ref 2–3.5)

## 2020-11-12 PROCEDURE — 99211 OFF/OP EST MAY X REQ PHY/QHP: CPT | Performed by: NURSE PRACTITIONER

## 2020-11-12 PROCEDURE — 85610 PROTHROMBIN TIME: CPT

## 2020-11-12 NOTE — PROGRESS NOTES
Anticoagulation Summary  As of 2020    INR goal:  2.0-3.0   TTR:  46.1 % (5.4 y)   INR used for dosin.90 (2020)   Warfarin maintenance plan:  4.5 mg (3 mg x 1.5) every Tue; 3 mg (3 mg x 1) all other days   Weekly warfarin total:  22.5 mg   Plan last modified:  Linda De La Paz AFrancinePEMMA (2020)   Next INR check:  12/3/2020   Target end date:  Indefinite    Indications    Pulmonary embolism (HCC) [I26.99]             Anticoagulation Episode Summary     INR check location:  Anticoagulation Clinic    Preferred lab:      Send INR reminders to:      Comments:  *she has a non-renown PCP fax [phone]*      Anticoagulation Care Providers     Provider Role Specialty Phone number    Aron Patel M.D. Referring Internal Medicine [phone]    [facility] Responsible  [phone]        Anticoagulation Patient Findings      HPI:  Annamaria Jorgensen seen in clinic today for follow up on anticoagulation therapy in the presence of PE hx.   Denies any changes to current medical/health status since last appointment.   Denies any medication or diet changes.   No current symptoms of bleeding or thrombosis reported.    A/P:   INR therapeutic.   Continue current regimen.   BP declined.    Follow up appointment in 3 week(s) per pt's request with upcoming holiday.    Next Appointment: Wednesday, Dec 2, 2020 at 1:30 pm.    Linda MISTRY

## 2020-12-02 ENCOUNTER — ANTICOAGULATION VISIT (OUTPATIENT)
Dept: VASCULAR LAB | Facility: MEDICAL CENTER | Age: 65
End: 2020-12-02
Attending: INTERNAL MEDICINE
Payer: MEDICARE

## 2020-12-02 DIAGNOSIS — Z79.01 CHRONIC ANTICOAGULATION: ICD-10-CM

## 2020-12-02 LAB — INR PPP: 3.9 (ref 2–3.5)

## 2020-12-02 PROCEDURE — 85610 PROTHROMBIN TIME: CPT

## 2020-12-02 PROCEDURE — 99212 OFFICE O/P EST SF 10 MIN: CPT

## 2020-12-02 NOTE — PROGRESS NOTES
Anticoagulation Summary  As of 12/2/2020    INR goal:  2.0-3.0   TTR:  45.7 % (5.4 y)   INR used for dosing:  3.90 (12/2/2020)   Warfarin maintenance plan:  4.5 mg (3 mg x 1.5) every Tue; 3 mg (3 mg x 1) all other days   Weekly warfarin total:  22.5 mg   Plan last modified:  SHANNAN Garcia (11/12/2020)   Next INR check:  12/15/2020   Target end date:  Indefinite    Indications    Pulmonary embolism (HCC) [I26.99]             Anticoagulation Episode Summary     INR check location:  Anticoagulation Clinic    Preferred lab:      Send INR reminders to:      Comments:  *she has a non-renown PCP fax [phone]*      Anticoagulation Care Providers     Provider Role Specialty Phone number    Aron Patel M.D. Referring Internal Medicine [phone]    [facility] Responsible  [phone]           Anticoagulation Patient Findings  Patient Findings     Positives:  Change in alcohol use (had a glass of wine), Missed doses (missed last night's dose, so she took 4.5 mg this AM), Extra doses, Change in diet/appetite (decreased intake in spinach)    Negatives:  Signs/symptoms of thrombosis, Signs/symptoms of bleeding, Laboratory test error suspected, Change in health, Change in activity, Upcoming invasive procedure, Emergency department visit, Upcoming dental procedure, Change in medications, Hospital admission, Bruising, Other complaints          HPI:  Annamaria Jorgensen seen in clinic today, on anticoagulation therapy with warfarin for PE  Changes to current medical/health status since last appt: none  Denies signs/symptoms of bleeding and/or thrombosis since the last appt.    Denies any interval changes to medications since last appt.   Denies any complications or cost restrictions with current therapy.   Verified current warfarin dosing schedule.   BP declined  Medications reconciled       A/P   INR  supra-therapeutic.   Pt already made up for missed dose last night.  Pt to HOLD x1 day then  continue regimen + continue normal intake of spinach    Follow up appointment in 2 week(s).    Carisa Gilman, PharmD

## 2020-12-09 LAB — INR BLD: 3.9 (ref 0.9–1.2)

## 2020-12-15 ENCOUNTER — HOSPITAL ENCOUNTER (OUTPATIENT)
Dept: LAB | Facility: MEDICAL CENTER | Age: 65
End: 2020-12-15
Attending: NURSE PRACTITIONER
Payer: MEDICARE

## 2020-12-15 ENCOUNTER — ANTICOAGULATION VISIT (OUTPATIENT)
Dept: VASCULAR LAB | Facility: MEDICAL CENTER | Age: 65
End: 2020-12-15
Attending: INTERNAL MEDICINE
Payer: MEDICARE

## 2020-12-15 DIAGNOSIS — Z79.01 CHRONIC ANTICOAGULATION: ICD-10-CM

## 2020-12-15 LAB
ALBUMIN SERPL BCP-MCNC: 4.1 G/DL (ref 3.2–4.9)
ALBUMIN/GLOB SERPL: 1.9 G/DL
ALP SERPL-CCNC: 119 U/L (ref 30–99)
ALT SERPL-CCNC: 57 U/L (ref 2–50)
ANION GAP SERPL CALC-SCNC: 6 MMOL/L (ref 7–16)
AST SERPL-CCNC: 48 U/L (ref 12–45)
BASOPHILS # BLD AUTO: 0.7 % (ref 0–1.8)
BASOPHILS # BLD: 0.05 K/UL (ref 0–0.12)
BILIRUB SERPL-MCNC: 0.2 MG/DL (ref 0.1–1.5)
BUN SERPL-MCNC: 12 MG/DL (ref 8–22)
CALCIUM SERPL-MCNC: 9.3 MG/DL (ref 8.5–10.5)
CHLORIDE SERPL-SCNC: 108 MMOL/L (ref 96–112)
CO2 SERPL-SCNC: 27 MMOL/L (ref 20–33)
CREAT SERPL-MCNC: 0.65 MG/DL (ref 0.5–1.4)
EOSINOPHIL # BLD AUTO: 0.22 K/UL (ref 0–0.51)
EOSINOPHIL NFR BLD: 3.2 % (ref 0–6.9)
ERYTHROCYTE [DISTWIDTH] IN BLOOD BY AUTOMATED COUNT: 45.1 FL (ref 35.9–50)
GLOBULIN SER CALC-MCNC: 2.2 G/DL (ref 1.9–3.5)
GLUCOSE SERPL-MCNC: 100 MG/DL (ref 65–99)
HCT VFR BLD AUTO: 36.9 % (ref 37–47)
HGB BLD-MCNC: 11.7 G/DL (ref 12–16)
IMM GRANULOCYTES # BLD AUTO: 0.02 K/UL (ref 0–0.11)
IMM GRANULOCYTES NFR BLD AUTO: 0.3 % (ref 0–0.9)
INR BLD: 5.6 (ref 0.9–1.2)
INR PPP: 5.6 (ref 2–3.5)
LYMPHOCYTES # BLD AUTO: 1.81 K/UL (ref 1–4.8)
LYMPHOCYTES NFR BLD: 25.9 % (ref 22–41)
MCH RBC QN AUTO: 30.1 PG (ref 27–33)
MCHC RBC AUTO-ENTMCNC: 31.7 G/DL (ref 33.6–35)
MCV RBC AUTO: 94.9 FL (ref 81.4–97.8)
MONOCYTES # BLD AUTO: 0.61 K/UL (ref 0–0.85)
MONOCYTES NFR BLD AUTO: 8.7 % (ref 0–13.4)
NEUTROPHILS # BLD AUTO: 4.27 K/UL (ref 2–7.15)
NEUTROPHILS NFR BLD: 61.2 % (ref 44–72)
NRBC # BLD AUTO: 0 K/UL
NRBC BLD-RTO: 0 /100 WBC
PLATELET # BLD AUTO: 285 K/UL (ref 164–446)
PMV BLD AUTO: 10.3 FL (ref 9–12.9)
POTASSIUM SERPL-SCNC: 4.4 MMOL/L (ref 3.6–5.5)
PROT SERPL-MCNC: 6.3 G/DL (ref 6–8.2)
RBC # BLD AUTO: 3.89 M/UL (ref 4.2–5.4)
SODIUM SERPL-SCNC: 141 MMOL/L (ref 135–145)
WBC # BLD AUTO: 7 K/UL (ref 4.8–10.8)

## 2020-12-15 PROCEDURE — 85610 PROTHROMBIN TIME: CPT

## 2020-12-15 PROCEDURE — 36415 COLL VENOUS BLD VENIPUNCTURE: CPT

## 2020-12-15 PROCEDURE — 80053 COMPREHEN METABOLIC PANEL: CPT

## 2020-12-15 PROCEDURE — 85025 COMPLETE CBC W/AUTO DIFF WBC: CPT

## 2020-12-15 PROCEDURE — 99212 OFFICE O/P EST SF 10 MIN: CPT

## 2020-12-15 NOTE — PROGRESS NOTES
Anticoagulation Summary  As of 12/15/2020    INR goal:  2.0-3.0   TTR:  45.4 % (5.5 y)   INR used for dosin.60 (12/15/2020)   Warfarin maintenance plan:  4.5 mg (3 mg x 1.5) every Tue; 3 mg (3 mg x 1) all other days   Weekly warfarin total:  22.5 mg   Plan last modified:  SHANNAN Garcia (2020)   Next INR check:  2020   Target end date:  Indefinite    Indications    Pulmonary embolism (HCC) [I26.99]             Anticoagulation Episode Summary     INR check location:  Anticoagulation Clinic    Preferred lab:      Send INR reminders to:      Comments:  *she has a non-renown PCP fax [phone]*      Anticoagulation Care Providers     Provider Role Specialty Phone number    Aron Patel M.D. Referring Internal Medicine [phone]    [facility] Responsible  [phone]        Anticoagulation Patient Findings      HPI:  Annamaria Jorgensen seen in clinic today, on anticoagulation therapy with warfarin for hx of PE  Changes to current medical/health status since last appt: None  Denies signs/symptoms of bleeding and/or thrombosis since the last appt.    Denies any interval changes to diet.  Denies any interval changes to medications since last appt.   Denies any complications or cost restrictions with current therapy.   BP declined.     A/P   INR  SUPRA-therapeutic. Pt states that she doubled up on her dosage last week.  Instructed pt to hold x 3 doses.     She is open to starting Xarelto 20 mg once daily pending copay - she will f/u w/ [facility] regarding cost.   If affordable, we can change to DOAC on Fri.  Pt will go get updated CMP and CBC after appt today.    Follow up appointment in 3 day(s).    Deep Fitzgerald, AlissaD

## 2020-12-18 ENCOUNTER — ANTICOAGULATION VISIT (OUTPATIENT)
Dept: VASCULAR LAB | Facility: MEDICAL CENTER | Age: 65
End: 2020-12-18
Attending: INTERNAL MEDICINE
Payer: MEDICARE

## 2020-12-18 DIAGNOSIS — I26.99 PULMONARY EMBOLISM, OTHER, UNSPECIFIED CHRONICITY, UNSPECIFIED WHETHER ACUTE COR PULMONALE PRESENT (HCC): ICD-10-CM

## 2020-12-18 LAB — INR PPP: 1.5 (ref 2–3.5)

## 2020-12-18 PROCEDURE — 99212 OFFICE O/P EST SF 10 MIN: CPT

## 2020-12-18 PROCEDURE — 85610 PROTHROMBIN TIME: CPT

## 2020-12-18 NOTE — PROGRESS NOTES
Anticoagulation Summary  As of 2020    INR goal:  2.0-3.0   TTR:  45.4 % (5.5 y)   INR used for dosin.50 (2020)   Warfarin maintenance plan:  4.5 mg (3 mg x 1.5) every Tue; 3 mg (3 mg x 1) all other days   Weekly warfarin total:  22.5 mg   Plan last modified:  SHANNAN Garcia (2020)   Next INR check:  2020   Target end date:  Indefinite    Indications    Pulmonary embolism (HCC) [I26.99]             Anticoagulation Episode Summary     INR check location:  Anticoagulation Clinic    Preferred lab:      Send INR reminders to:      Comments:  *she has a non-renown PCP fax [phone]*      Anticoagulation Care Providers     Provider Role Specialty Phone number    Aron Patel M.D. Referring Internal Medicine [phone]    [facility] Responsible  [phone]        Anticoagulation Patient Findings    HPI:  Annamaria Jorgensen seen in clinic today, on anticoagulation therapy with warfarin for pulmonary embolism   Changes to current medical/health status since last appt: none  Denies signs/symptoms of bleeding and/or thrombosis since the last appt.    Denies any interval changes to diet  Denies any interval changes to medications since last appt.   Pt noted that she has started taking more daily doses of Advil (2-3 tabs/day) due to her knee pain. I advised her to switch to Tylenol to decrease the risk of bleeding with the combination of Advil and Warfarin.   Denies any complications or cost restrictions with current therapy.   Verified current warfarin dosing schedule.   BP: Declined  Medications reconciled: yes  Pt is NOT on antiplatelet therapy      A/P   INR sub-therapeutic at 1.5  Pt was seen in clinic 3 days ago with a supratherapeutic INR of 5.6. She had doubled her dose of warfarin after missing a previous dose. She was instructed to skip warfarin the next three days. Today she is seen with an INR of 1.5.     Pt is instructed to bolus with 6mg today, then  continue with her usual warfarin regimen thereafter.     Follow up appointment in 1 week. Pt requested to be seen before the holidays to check INR.    Emmy Salgado, AlissaD

## 2020-12-21 LAB — INR BLD: 1.5 (ref 0.9–1.2)

## 2020-12-23 ENCOUNTER — ANTICOAGULATION VISIT (OUTPATIENT)
Dept: VASCULAR LAB | Facility: MEDICAL CENTER | Age: 65
End: 2020-12-23
Attending: INTERNAL MEDICINE
Payer: MEDICARE

## 2020-12-23 VITALS — HEART RATE: 76 BPM | DIASTOLIC BLOOD PRESSURE: 62 MMHG | SYSTOLIC BLOOD PRESSURE: 117 MMHG

## 2020-12-23 DIAGNOSIS — I26.99 PULMONARY EMBOLISM, OTHER, UNSPECIFIED CHRONICITY, UNSPECIFIED WHETHER ACUTE COR PULMONALE PRESENT (HCC): ICD-10-CM

## 2020-12-23 LAB
INR BLD: 1.9 (ref 0.9–1.2)
INR PPP: 1.9 (ref 2–3.5)

## 2020-12-23 PROCEDURE — 99212 OFFICE O/P EST SF 10 MIN: CPT | Performed by: NURSE PRACTITIONER

## 2020-12-23 PROCEDURE — 85610 PROTHROMBIN TIME: CPT

## 2020-12-23 NOTE — PROGRESS NOTES
Anticoagulation Summary  As of 2020    INR goal:  2.0-3.0   TTR:  45.3 % (5.5 y)   INR used for dosin.90 (2020)   Warfarin maintenance plan:  4.5 mg (3 mg x 1.5) every Tue; 3 mg (3 mg x 1) all other days   Weekly warfarin total:  22.5 mg   Plan last modified:  PABLO GarciaPEMMA (2020)   Next INR check:  2021   Target end date:  Indefinite    Indications    Pulmonary embolism (HCC) [I26.99]             Anticoagulation Episode Summary     INR check location:  Anticoagulation Clinic    Preferred lab:      Send INR reminders to:      Comments:  *she has a non-renown PCP fax [phone]*      Anticoagulation Care Providers     Provider Role Specialty Phone number    Aron Patel M.D. Referring Internal Medicine [phone]    [facility] Responsible  [phone]        Anticoagulation Patient Findings      HPI:  Annamaria Jorgensen seen in clinic today for follow up on anticoagulation therapy in the presence of PE hx.   Denies any changes to current medical/health status since last appointment.   She reports having a large kale salad this week which she doesn't normally eat.   Denies any diet changes.   No current symptoms of bleeding or thrombosis reported.    She hasn't had the chance to call [employer] to inquire about the cost of Xarelto. She will try to call this week and let us know.    Pt on antiplatelet therapy - none.    A/P:   INR slightly subtherapeutic.   Continue current regimen as INR slightly out of range and was 5.6 last week.   BP recorded in vitals.    Follow up appointment in 3 week(s) per pt's request even though I suggested sooner f/u.    Next Appointment:  at 1:30 pm.    Linda MISTRY

## 2021-01-08 DIAGNOSIS — Z79.01 CHRONIC ANTICOAGULATION: ICD-10-CM

## 2021-01-13 ENCOUNTER — ANTICOAGULATION VISIT (OUTPATIENT)
Dept: VASCULAR LAB | Facility: MEDICAL CENTER | Age: 66
End: 2021-01-13
Attending: INTERNAL MEDICINE
Payer: MEDICARE

## 2021-01-13 DIAGNOSIS — I26.99 PULMONARY EMBOLISM, OTHER, UNSPECIFIED CHRONICITY, UNSPECIFIED WHETHER ACUTE COR PULMONALE PRESENT (HCC): ICD-10-CM

## 2021-01-13 LAB
INR BLD: 5.1 (ref 0.9–1.2)
INR PPP: 5.1 (ref 2–3.5)

## 2021-01-13 PROCEDURE — 85610 PROTHROMBIN TIME: CPT

## 2021-01-13 PROCEDURE — 99212 OFFICE O/P EST SF 10 MIN: CPT | Performed by: NURSE PRACTITIONER

## 2021-01-13 NOTE — PROGRESS NOTES
Anticoagulation Summary  As of 2021    INR goal:  2.0-3.0   TTR:  45.1 % (5.6 y)   INR used for dosin.10 (2021)   Warfarin maintenance plan:  3 mg (3 mg x 1) every day   Weekly warfarin total:  21 mg   Plan last modified:  SHANNAN Garcia (2021)   Next INR check:  2021   Target end date:  Indefinite    Indications    Pulmonary embolism (HCC) [I26.99]             Anticoagulation Episode Summary     INR check location:  Anticoagulation Clinic    Preferred lab:      Send INR reminders to:      Comments:  *she has a non-renown PCP fax [phone]*      Anticoagulation Care Providers     Provider Role Specialty Phone number    Aron Patel M.D. Referring Internal Medicine [phone]    [facility] Responsible  [phone]        Anticoagulation Patient Findings      HPI:  Annamaria Jorgensen seen in clinic today for follow up on anticoagulation therapy in the presence of PE hx.   Denies any changes to current medical/health status since last appointment.   Only change is taking fish oil and ate less greens this week than normal. She is consistent with the fish oil. Aware that concurrent use with warfarin can increase her bleeding risk.   No current symptoms of bleeding or thrombosis reported.    She checked the cost of Xarelto and it is too expensive for her.    Pt on antiplatelet therapy - none.    A/P:   INR supratherapeutic.   HOLD two doses and began reduced current regimen. Seek emergent medical attention for any prolonged or serious bleeding.  BP declined.    Follow up appointment in 1 week(s).    Next Appointment:  at 1:45 pm.    Linda MISTRY

## 2021-01-20 ENCOUNTER — ANTICOAGULATION VISIT (OUTPATIENT)
Dept: VASCULAR LAB | Facility: MEDICAL CENTER | Age: 66
End: 2021-01-20
Attending: INTERNAL MEDICINE
Payer: MEDICARE

## 2021-01-20 DIAGNOSIS — Z79.01 CHRONIC ANTICOAGULATION: ICD-10-CM

## 2021-01-20 LAB
INR BLD: 2.2 (ref 0.9–1.2)
INR PPP: 2.2 (ref 2–3.5)

## 2021-01-20 PROCEDURE — 99211 OFF/OP EST MAY X REQ PHY/QHP: CPT

## 2021-01-20 PROCEDURE — 85610 PROTHROMBIN TIME: CPT

## 2021-01-20 NOTE — PROGRESS NOTES
Anticoagulation Summary  As of 2021    INR goal:  2.0-3.0   TTR:  45.1 % (5.6 y)   INR used for dosin.20 (2021)   Warfarin maintenance plan:  3 mg (3 mg x 1) every day   Weekly warfarin total:  21 mg   Plan last modified:  SHANNAN Garcia (2021)   Next INR check:  2/3/2021   Target end date:  Indefinite    Indications    Pulmonary embolism (HCC) [I26.99]             Anticoagulation Episode Summary     INR check location:  Anticoagulation Clinic    Preferred lab:      Send INR reminders to:      Comments:  *she has a non-renown PCP fax [phone]*      Anticoagulation Care Providers     Provider Role Specialty Phone number    Aron Patel M.D. Referring Internal Medicine [phone]    [facility] Responsible  [phone]                Anticoagulation Patient Findings      HPI:  Annamaria Jorgensen seen in clinic today, on anticoagulation therapy with warfarin for h/o PE  Changes to current medical/health status since last appt: None  Denies signs/symptoms of bleeding and/or thrombosis since the last appt.    Denies any interval changes to diet - she continues to limit Vitamin K containing foods  Denies any interval changes to medications since last appt. She continues with Fish Oil supplementation   Denies any complications or cost restrictions with current therapy.   Verified current warfarin dosing schedule.   Pt declines vitals due to Covid transmission concerns.   Pt NOT on antiplatelet therapy       A/P   INR  therapeutic at 2.2  Pt is to continue with current warfarin dosing regimen.      Follow up appointment in 2 week(s).    Max Brown, AlissaD

## 2021-02-19 ENCOUNTER — TELEPHONE (OUTPATIENT)
Dept: VASCULAR LAB | Facility: MEDICAL CENTER | Age: 66
End: 2021-02-19

## 2021-02-20 NOTE — TELEPHONE ENCOUNTER
[facility]      Pt is over due for PT/INR for warfarin monitoring. Left message for patient to have INR checked ASAP.   Clinic phone number left for any questions or concerns.    Alondra Goel  PharmD

## 2021-03-03 DIAGNOSIS — Z23 NEED FOR VACCINATION: ICD-10-CM

## 2021-03-12 ENCOUNTER — TELEPHONE (OUTPATIENT)
Dept: VASCULAR LAB | Facility: MEDICAL CENTER | Age: 66
End: 2021-03-12

## 2021-03-12 NOTE — LETTER
March 12, 2021    Annamaria Jorgensen  [address]  [address]  [place] [address]    03/12/21    Dear Annamaria Jorgensen ,    We have been unsuccessful in our attempts to contact you regarding your Anticoagulation Service appointments. Warfarin is a potent blood-thinning agent that requires monitoring to ensure that the dosage is correct for your body.  If it isn't, you could develop serious, sometimes life-threatening bleeding problems or life-threatening blood clots or stroke could result.    To monitor you effectively, we need to be able to communicate with you.  This is a requirement to be followed by our Service.       If you repeatedly fail to keep your lab appointments, you are at risk of being discharged from the Anticoagulation Service.    It is extremely important that you contact the clinic as soon as possible to arrange appropriate follow up.  We are open Monday-Friday 8 am until 5 pm.  You may reach our Service at [phone].           Sincerely,           Ray Brizuela PharmD, [facility]  Clinic Supervisor  [facility]  Outpatient Anticoagulation Service

## 2021-03-13 NOTE — TELEPHONE ENCOUNTER
Unable to leave vM message to get INR checked.  All numbers are out of service  Letter sent  Annamaria Lopez, Clinical Pharmacist, CDE, CACP

## 2021-03-24 DIAGNOSIS — I26.99 OTHER PULMONARY EMBOLISM WITHOUT ACUTE COR PULMONALE, UNSPECIFIED CHRONICITY (HCC): ICD-10-CM

## 2021-03-24 RX ORDER — WARFARIN SODIUM 3 MG/1
TABLET ORAL
Qty: 30 TABLET | Refills: 0 | Status: SHIPPED | OUTPATIENT
Start: 2021-03-24 | End: 2021-04-27 | Stop reason: SDUPTHER

## 2021-04-27 ENCOUNTER — ANTICOAGULATION VISIT (OUTPATIENT)
Dept: VASCULAR LAB | Facility: MEDICAL CENTER | Age: 66
End: 2021-04-27
Attending: INTERNAL MEDICINE
Payer: MEDICARE

## 2021-04-27 DIAGNOSIS — I26.99 OTHER PULMONARY EMBOLISM WITHOUT ACUTE COR PULMONALE, UNSPECIFIED CHRONICITY (HCC): ICD-10-CM

## 2021-04-27 DIAGNOSIS — Z79.01 CHRONIC ANTICOAGULATION: Primary | ICD-10-CM

## 2021-04-27 LAB — INR PPP: 2 (ref 2–3.5)

## 2021-04-27 PROCEDURE — 99211 OFF/OP EST MAY X REQ PHY/QHP: CPT

## 2021-04-27 PROCEDURE — 85610 PROTHROMBIN TIME: CPT

## 2021-04-27 RX ORDER — WARFARIN SODIUM 3 MG/1
TABLET ORAL
Qty: 100 TABLET | Refills: 1 | Status: SHIPPED | OUTPATIENT
Start: 2021-04-27 | End: 2021-12-01

## 2021-04-27 NOTE — PROGRESS NOTES
Anticoagulation Summary  As of 2021    INR goal:  2.0-3.0   TTR:  47.6 % (5.8 y)   INR used for dosin.00 (2021)   Warfarin maintenance plan:  3 mg (3 mg x 1) every day   Weekly warfarin total:  21 mg   Plan last modified:  SHANNAN Garcia (2021)   Next INR check:  2021   Target end date:  Indefinite    Indications    Pulmonary embolism (HCC) [I26.99]             Anticoagulation Episode Summary     INR check location:  Anticoagulation Clinic    Preferred lab:      Send INR reminders to:      Comments:  *she has a non-renown PCP fax [phone]*      Anticoagulation Care Providers     Provider Role Specialty Phone number    Aron Patel M.D. Referring Internal Medicine [phone]    [facility] Responsible  [phone]                Anticoagulation Patient Findings  Patient Findings     Positives:  Missed doses, Change in diet/appetite    Comments:  Missed dose 4-5 days ago; Had salad last evening          HPI:  Annamaria Jorgensen seen in clinic today, on anticoagulation therapy with warfarin for h/o of PE  Changes to current medical/health status since last appt: DENIES  Denies signs/symptoms of bleeding and/or thrombosis since the last appt.    Denies any permanent interval changes to diet  Denies any interval changes to medications since last appt.   Denies any complications or cost restrictions with current therapy.   Verified current warfarin dosing schedule. Pt missed dose within the past week  Pt declines vitals due to Covid transmission concerns.   Medications reconciled   Pt NOT on antiplatelet therapy         A/P   INR  therapeutic today  Pt is to continue with current warfarin dosing regimen.      Pt last seen ~14 weeks ago. Discussed the importance of having routine monitoring, pt understood and agreed. Will monitor on more frequent basis to emphasize adherence and clinic expectations for now along with recently missing a dose.    Follow up appointment  in 4 week(s).    Max Brown, AlissaD

## 2021-04-28 LAB — INR BLD: 2 (ref 0.9–1.2)

## 2021-05-26 ENCOUNTER — ANTICOAGULATION VISIT (OUTPATIENT)
Dept: VASCULAR LAB | Facility: MEDICAL CENTER | Age: 66
End: 2021-05-26
Attending: INTERNAL MEDICINE
Payer: MEDICARE

## 2021-05-26 DIAGNOSIS — I26.99 OTHER PULMONARY EMBOLISM WITHOUT ACUTE COR PULMONALE, UNSPECIFIED CHRONICITY (HCC): ICD-10-CM

## 2021-05-26 LAB
INR BLD: 2 (ref 0.9–1.2)
INR PPP: 2 (ref 2–3.5)

## 2021-05-26 PROCEDURE — 85610 PROTHROMBIN TIME: CPT

## 2021-05-26 PROCEDURE — 99211 OFF/OP EST MAY X REQ PHY/QHP: CPT | Performed by: NURSE PRACTITIONER

## 2021-05-26 NOTE — PROGRESS NOTES
Anticoagulation Summary  As of 2021    INR goal:  2.0-3.0   TTR:  48.3 % (5.9 y)   INR used for dosin.00 (2021)   Warfarin maintenance plan:  3 mg (3 mg x 1) every day   Weekly warfarin total:  21 mg   Plan last modified:  SHANNAN Garcia (2021)   Next INR check:     Target end date:  Indefinite    Indications    Pulmonary embolism (HCC) [I26.99]             Anticoagulation Episode Summary     INR check location:  Anticoagulation Clinic    Preferred lab:      Send INR reminders to:      Comments:  *she has a non-renown PCP fax [phone]*      Anticoagulation Care Providers     Provider Role Specialty Phone number    Aron Patel M.D. Referring Internal Medicine [phone]    [facility] Responsible  [phone]        Anticoagulation Patient Findings      HPI:  Annamaria Jorgensen seen in clinic today for follow up on anticoagulation therapy in the presence of PE.   Denies any changes to current medical/health status since last appointment.   Denies any medication or diet changes.   No current symptoms of bleeding or thrombosis reported.  Verified dose with patient.    Pt is not on antiplatelet therapy.    A/P:   INR therapeutic.   Continue current regimen.     Pt educated to contact our clinic with any changes in medications or s/s of bleeding or thrombosis. Pt is aware to seek immediate medical attention for falls, head injury or deep cuts    Follow up appointment in 6 week(s).    Next Appointment: 2021 at 1:30 pm.    Linda MISTRY

## 2021-07-08 ENCOUNTER — DOCUMENTATION (OUTPATIENT)
Dept: VASCULAR LAB | Facility: MEDICAL CENTER | Age: 66
End: 2021-07-08

## 2021-07-08 ENCOUNTER — OFFICE VISIT (OUTPATIENT)
Dept: MEDICAL GROUP | Facility: MEDICAL CENTER | Age: 66
End: 2021-07-08
Payer: MEDICARE

## 2021-07-08 VITALS
TEMPERATURE: 97 F | SYSTOLIC BLOOD PRESSURE: 122 MMHG | BODY MASS INDEX: 26.96 KG/M2 | WEIGHT: 162 LBS | DIASTOLIC BLOOD PRESSURE: 58 MMHG | HEART RATE: 86 BPM | RESPIRATION RATE: 16 BRPM | OXYGEN SATURATION: 98 %

## 2021-07-08 DIAGNOSIS — Z00.00 MEDICARE ANNUAL WELLNESS VISIT, SUBSEQUENT: Primary | ICD-10-CM

## 2021-07-08 DIAGNOSIS — R73.01 IMPAIRED FASTING GLUCOSE: ICD-10-CM

## 2021-07-08 DIAGNOSIS — Z12.31 ENCOUNTER FOR SCREENING MAMMOGRAM FOR MALIGNANT NEOPLASM OF BREAST: ICD-10-CM

## 2021-07-08 DIAGNOSIS — F33.0 MILD EPISODE OF RECURRENT MAJOR DEPRESSIVE DISORDER (HCC): ICD-10-CM

## 2021-07-08 DIAGNOSIS — Z00.00 ENCOUNTER FOR MEDICARE ANNUAL WELLNESS EXAM: ICD-10-CM

## 2021-07-08 DIAGNOSIS — Z86.711 HISTORY OF PULMONARY EMBOLUS (PE): ICD-10-CM

## 2021-07-08 DIAGNOSIS — Z98.84 S/P LAPAROSCOPIC SLEEVE GASTRECTOMY: ICD-10-CM

## 2021-07-08 DIAGNOSIS — Z78.0 MENOPAUSE: ICD-10-CM

## 2021-07-08 PROCEDURE — G0439 PPPS, SUBSEQ VISIT: HCPCS | Performed by: STUDENT IN AN ORGANIZED HEALTH CARE EDUCATION/TRAINING PROGRAM

## 2021-07-08 RX ORDER — BUPROPION HYDROCHLORIDE 300 MG/1
300 TABLET ORAL EVERY MORNING
Qty: 30 TABLET | Refills: 11 | Status: SHIPPED | OUTPATIENT
Start: 2021-07-08 | End: 2022-10-10

## 2021-07-08 ASSESSMENT — PATIENT HEALTH QUESTIONNAIRE - PHQ9
7. TROUBLE CONCENTRATING ON THINGS, SUCH AS READING THE NEWSPAPER OR WATCHING TELEVISION: NOT AT ALL
9. THOUGHTS THAT YOU WOULD BE BETTER OFF DEAD, OR OF HURTING YOURSELF: NOT AT ALL
SUM OF ALL RESPONSES TO PHQ QUESTIONS 1-9: 0
6. FEELING BAD ABOUT YOURSELF - OR THAT YOU ARE A FAILURE OR HAVE LET YOURSELF OR YOUR FAMILY DOWN: NOT AL ALL
1. LITTLE INTEREST OR PLEASURE IN DOING THINGS: NOT AT ALL
3. TROUBLE FALLING OR STAYING ASLEEP OR SLEEPING TOO MUCH: NOT AT ALL
5. POOR APPETITE OR OVEREATING: NOT AT ALL
8. MOVING OR SPEAKING SO SLOWLY THAT OTHER PEOPLE COULD HAVE NOTICED. OR THE OPPOSITE, BEING SO FIGETY OR RESTLESS THAT YOU HAVE BEEN MOVING AROUND A LOT MORE THAN USUAL: NOT AT ALL
4. FEELING TIRED OR HAVING LITTLE ENERGY: NOT AT ALL
SUM OF ALL RESPONSES TO PHQ9 QUESTIONS 1 AND 2: 0
2. FEELING DOWN, DEPRESSED, IRRITABLE, OR HOPELESS: NOT AT ALL

## 2021-07-08 ASSESSMENT — FIBROSIS 4 INDEX: FIB4 SCORE: 1.52

## 2021-07-08 NOTE — PROGRESS NOTES
Chief Complaint   Patient presents with   • Annual Exam       HPI:  Annamaria is a 66 y.o. here for Medicare Annual Wellness Visit    Patient Active Problem List    Diagnosis Date Noted   • Chronic anticoagulation 05/01/2020   • S/P laparoscopic sleeve gastrectomy 02/19/2019   • Mild episode of recurrent major depressive disorder (HCC) 02/19/2019   • Impaired fasting glucose 02/19/2019   • Pulmonary embolism (HCC) 04/30/2015       Current Outpatient Medications   Medication Sig Dispense Refill   • buPROPion (WELLBUTRIN XL) 300 MG XL tablet Take 1 tablet by mouth every morning. 30 tablet 11   • warfarin (COUMADIN) 3 MG Tab TAKE ONE TO ONE AND A HALF TABLETS BY MOUTH ONE TIME DAILY OR AS DIRECTED BY COUMADIN CLINIC 100 tablet 1   • Turmeric (QC TUMERIC COMPLEX PO) Take  by mouth.     • Omega-3 Fatty Acids (FISH OIL PO) Take  by mouth.       No current facility-administered medications for this visit.        Patient is taking medications as noted in medication list.  Current supplements as per medication list.     Allergies: Erythromycin, Levaquin, and Tape    Current social contact/activities: Patient states that she does not interact in many social activities.  Patient states that she does not have a car and it is more challenging for her to get around.  Patient states that she is single.     Is patient current with immunizations? Yes.    She  reports that she has never smoked. She has never used smokeless tobacco. She reports current alcohol use. She reports that she does not use drugs.  Counseling given: Not Answered      DPA/Advanced directive: Patient does not have an Advanced Directive.  A packet and workshop information was given on Advanced Directives.    ROS:    Gait: Uses no assistive device No   Ostomy: No   Other tubes: No   Amputations: No   Chronic oxygen use No   Last eye exam 05/01/2018  Wears hearing aids: No   : Reports urinary leakage during the last 6 months that has somewhat interfered with their  daily activities or sleep.      Screening:    Depression Screening  Little interest or pleasure in doing things?0     Feeling down, depressed, or hopeless?0    Trouble falling or staying asleep, or sleeping too much? 0    Feeling tired or having little energy? 0    Poor appetite or overeating?0     Feeling bad about yourself - or that you are a failure or have let yourself or your family down?0    Trouble concentrating on things, such as reading the newspaper or watching television?0    Moving or speaking so slowly that other people could have noticed.  Or the opposite - being so fidgety or restless that you have been moving around a lot more than usual? 0    Thoughts that you would be better off dead, or of hurting yourself? 0    Patient Health Questionnaire Score:0      If depressive symptoms identified deferred to follow up visit unless specifically addressed in assessment and plan.    Interpretation of PHQ-9 Total Score   Score Severity   1-4 No Depression   5-9 Mild Depression   10-14 Moderate Depression   15-19 Moderately Severe Depression   20-27 Severe Depression    Fall Risk Assessment  Has the patient had two or more falls in the last year or any fall with injury in the last year?  Yes  If fall risk identified, deferred for follow up unless specifically addressed in assessment and plan.    Safety Assessment  Throw rugs on floor. No  Handrails on all stairs. No stairs    Good lighting in all hallways. YES    Difficulty hearing. YES    Patient counseled about all safety risks that were identified.    Functional Assessment ADLs  Are there any barriers preventing you from cooking for yourself or meeting nutritional needs? NO  .    Are there any barriers preventing you from driving safely or obtaining transportation?   No.    Are there any barriers preventing you from using a telephone or calling for help?   .No    Are there any barriers preventing you from shopping?   NO.    Are there any barriers preventing  you from taking care of your own finances?   .No    Are there any barriers preventing you from managing your medications?     .No    Are there any barriers preventing you from showering, bathing or dressing yourself?   .No    Are you currently engaging in any exercise or physical activity?   . YES    What is your perception of your health?   .GOOD    Health Maintenance Summary                HEPATITIS C SCREENING Overdue 1955     COVID-19 Vaccine Overdue 3/6/1965     COLONOSCOPY Overdue 3/6/2003     BONE DENSITY Overdue 4/21/2021      Done 4/21/2016 DS-BONE DENSITY STUDY (DEXA)     Patient has more history with this topic...    MAMMOGRAM Overdue 6/15/2021      Done 6/15/2020 MA-SCREENING MAMMO BILAT W/TOMOSYNTHESIS W/CAD     Patient has more history with this topic...    IMM ZOSTER VACCINES Postponed 2/17/2023 Originally 12/7/2016. System: vaccine not available, other system reasons     Done 10/12/2016 Imm Admin: Zoster Vaccine Live (ZVL) (Zostavax) - HISTORICAL DATA    IMM INFLUENZA Next Due 9/1/2021      Done 10/16/2017 Imm Admin: Influenza Seasonal Injectable - Historical Data     Patient has more history with this topic...    Annual Wellness Visit Next Due 7/9/2022      Done 7/8/2021 Visit Dx: Encounter for Medicare annual wellness exam     Patient has more history with this topic...    IMM DTaP/Tdap/Td Vaccine Next Due 5/25/2026      Done 5/25/2016 Imm Admin: Tdap Vaccine          Patient Care Team:  SHANNAN Roach as PCP - General (Internal Medicine)  Rupal Hood as      Social History     Tobacco Use   • Smoking status: Never Smoker   • Smokeless tobacco: Never Used   Substance Use Topics   • Alcohol use: Yes     Comment: RARELY   • Drug use: No     Family History   Problem Relation Age of Onset   • Stroke Maternal Grandmother    • Stroke Mother    • Cancer Father    • Lung Disease Father      She  has a past medical history of Anxiety disorder, Arthritis, ASTHMA,  Backpain, Bronchitis, Cancer (HCC) (1998), Carpal tunnel syndrome on both sides, CATARACT, Depression, Fibromyalgia, Frequent UTI, Heart valve disease, Hiatus hernia syndrome, Indigestion, Jaundice, Pain, Palpitations, Pulmonary embolism (HCC), Pulmonary embolism (HCC) (4/30/2015), S/P tonsillectomy, Scoliosis, Sleep apnea, Unspecified hemorrhagic conditions, Unspecified urinary incontinence, and Urinary bladder disorder. She also has no past medical history of Angina, Congestive heart failure (HCC), COPD, Diabetes, Dialysis, Glaucoma, Heart murmur, Hypertension, Myocardial infarct (HCC), Other specified symptom associated with female genital organs, Pacemaker, Pneumonia, Renal disorder, Rheumatic fever, Seizure (HCC), Stroke ([facility]), or Unspecified disorder of thyroid.   Past Surgical History:   Procedure Laterality Date   • GASTRIC SLEEVE LAPAROSCOPY  12/12/2012    Performed by Cirilo Coombs M.D. at SURGERY [facility] ORS   • SHOULDER DECOMPRESSION ARTHROSCOPIC  7/8/08    Performed by JOHN LAMA at SURGERY [facility]   • ROTATOR CUFF REPAIR  7/8/08    Performed by JOHN LAMA at [facility]   • CHOLECYSTECTOMY  2001   • TONSILLECTOMY AND ADENOIDECTOMY  1963         Exam:   /58 (BP Location: Right arm, Patient Position: Sitting, BP Cuff Size: Adult)   Pulse 86   Temp 36.1 °C (97 °F)   Resp 16   Wt 73.5 kg (162 lb)   SpO2 98%  Body mass index is 26.96 kg/m².    Hearing good.    Dentition good  Alert, oriented in no acute distress.  Eye contact is good, speech goal directed, affect calm      Assessment and Plan. The following treatment and monitoring plan is recommended:      1. Encounter for Medicare annual wellness exam  - CBC WITHOUT DIFFERENTIAL; Future  - Comp Metabolic Panel; Future  - Lipid Profile; Future  - TSH; Future  - FREE THYROXINE; Future    2. Impaired fasting glucose  Chronic, stable.  Patient with elevated fasting glucose in the past.  Patient with no  diagnosis of diabetes.  We will continue to follow and evaluate.  - Comp Metabolic Panel; Future    3. Encounter for screening mammogram for malignant neoplasm of breast  - MA-SCREENING MAMMO BILAT W/TOMOSYNTHESIS W/CAD; Future    4. Mild episode of recurrent major depressive disorder (HCC)  Chronic, stable.  Patient denies depression today.  Patient does seem down due to the fact that she does not have a car and has to walk everywhere.  Patient also states that she is single and unable to get a colonoscopy due to having no one to pick her up.  Patient PHQ-9 score of 0 today.  Continue to follow.  - buPROPion (WELLBUTRIN XL) 300 MG XL tablet; Take 1 tablet by mouth every morning.  Dispense: 30 tablet; Refill: 11    5. Menopause  - DS-BONE DENSITY STUDY (DEXA); Future    6. S/P laparoscopic sleeve gastrectomy  Chronic, stable.  Patient states significant weight loss since sleeve gastrectomy.  Encourage patient to get labs to evaluate for deficiencies.  Patient does continue on significant amount of vitamins including B12 and D3.    7.  History of pulmonary embolism  Patient remains chronically anticoagulated due to history of pulmonary embolism.  Patient continues to follow with Coumadin clinic.  Patient has no new concerns about this medication.      Services suggested: No services needed at this time  Health Care Screening recommendations as per orders if indicated.  Referrals offered: PT/OT/Nutrition counseling/Behavioral Health/Smoking cessation as per orders if indicated.    Discussion today about general wellness and lifestyle habits:    · Prevent falls and reduce trip hazards; Cautioned about securing or removing rugs.  · Have a working fire alarm and carbon monoxide detector;   · Engage in regular physical activity and social activities     Follow-up: Return in about 1 year (around 7/8/2022).

## 2021-07-08 NOTE — PROGRESS NOTES
Annual Health Assessment Questions:    1.  Are you currently engaging in any exercise or physical activity? Yes    2.  How would you describe your mood or emotional well-being today? good    3.  Have you had any falls in the last year? Yes    4.  Have you noticed any problems with your balance or had difficulty walking? No    5.  In the last six months have you experienced any leakage of urine? Yes    6. DPA/Advanced Directive: Patient does not have an Advanced Directive.  A packet and workshop information was given on Advanced Directives.

## 2021-07-08 NOTE — PROGRESS NOTES
Called pt and left VM for pt to call back to get rescheduled for missed routine appt with anticoagulation clinic.

## 2021-07-16 ENCOUNTER — ANTICOAGULATION VISIT (OUTPATIENT)
Dept: VASCULAR LAB | Facility: MEDICAL CENTER | Age: 66
End: 2021-07-16
Attending: INTERNAL MEDICINE
Payer: MEDICARE

## 2021-07-16 VITALS — SYSTOLIC BLOOD PRESSURE: 114 MMHG | HEART RATE: 82 BPM | DIASTOLIC BLOOD PRESSURE: 71 MMHG

## 2021-07-16 DIAGNOSIS — Z86.711 HISTORY OF PULMONARY EMBOLUS (PE): ICD-10-CM

## 2021-07-16 LAB — INR PPP: 2.1 (ref 2–3.5)

## 2021-07-16 PROCEDURE — 85610 PROTHROMBIN TIME: CPT

## 2021-07-16 PROCEDURE — 99211 OFF/OP EST MAY X REQ PHY/QHP: CPT

## 2021-07-16 NOTE — PROGRESS NOTES
Anticoagulation Summary  As of 2021    INR goal:  2.0-3.0   TTR:  49.5 % (6.1 y)   INR used for dosin.10 (2021)   Warfarin maintenance plan:  3 mg (3 mg x 1) every day   Weekly warfarin total:  21 mg   Plan last modified:  SHANNAN Garcia (2021)   Next INR check:  2021   Target end date:  Indefinite    Indications    History of pulmonary embolus (PE) [Z86.711]             Anticoagulation Episode Summary     INR check location:  Anticoagulation Clinic    Preferred lab:      Send INR reminders to:      Comments:  *she has a non-renown PCP fax [phone]*      Anticoagulation Care Providers     Provider Role Specialty Phone number    Aron Patel M.D. Referring Internal Medicine [phone]    [facility] Responsible  [phone]        Anticoagulation Patient Findings      HPI:  Annamaria Jorgensen seen in clinic today, on anticoagulation therapy with warfarin for hx of PE.  Changes to current medical/health status since last appt: none  Denies signs/symptoms of bleeding and/or thrombosis since the last appt.    Denies any interval changes to diet  Denies any interval changes to medications since last appt.   Denies any complications or cost restrictions with current therapy.   BP recorded in vitals.  Confirmed current dosing regimen.     Patient is not on antiplatelet therapy.       A/P   INR is therapeutic today at 2.1.  Patient instructed to continue with the current warfarin dosing regimen, and asked to follow up again in 6 weeks.       Next appt: Thursday, Aug 26, 2021  1:30pm    Alondra Goel PharmD

## 2021-07-22 ENCOUNTER — PATIENT OUTREACH (OUTPATIENT)
Dept: HEALTH INFORMATION MANAGEMENT | Facility: OTHER | Age: 66
End: 2021-07-22

## 2021-07-22 NOTE — NON-PROVIDER
Outcome: Left Message    Please transfer to Patient Outreach Team at [phone] when patient returns call.    [facility] Verified: yes    Attempt # 1

## 2021-08-26 ENCOUNTER — ANTICOAGULATION VISIT (OUTPATIENT)
Dept: VASCULAR LAB | Facility: MEDICAL CENTER | Age: 66
End: 2021-08-26
Attending: INTERNAL MEDICINE
Payer: MEDICARE

## 2021-08-26 DIAGNOSIS — Z86.711 HISTORY OF PULMONARY EMBOLUS (PE): ICD-10-CM

## 2021-08-26 LAB
INR BLD: 2.9 (ref 0.9–1.2)
INR PPP: 2.9 (ref 2–3.5)

## 2021-08-26 PROCEDURE — 85610 PROTHROMBIN TIME: CPT

## 2021-08-26 PROCEDURE — 99211 OFF/OP EST MAY X REQ PHY/QHP: CPT | Performed by: NURSE PRACTITIONER

## 2021-08-26 NOTE — PROGRESS NOTES
Anticoagulation Summary  As of 2021    INR goal:  2.0-3.0   TTR:  50.4 % (6.2 y)   INR used for dosin.90 (2021)   Warfarin maintenance plan:  3 mg (3 mg x 1) every day   Weekly warfarin total:  21 mg   Plan last modified:  SHANNAN Garcia (2021)   Next INR check:  10/7/2021   Target end date:  Indefinite    Indications    History of pulmonary embolus (PE) [Z86.711]             Anticoagulation Episode Summary     INR check location:  Anticoagulation Clinic    Preferred lab:      Send INR reminders to:      Comments:  *she has a non-renown PCP fax [phone]*      Anticoagulation Care Providers     Provider Role Specialty Phone number    Aron Patel M.D. Referring Internal Medicine [phone]    [facility] Responsible  [phone]                    Refer to Patient Findings for HPI:      Verified current warfarin dosing schedule.      Medications reconciled   Pt is not on antiplatelet therapy      A/P   INR  -therapeutic.     Warfarin dosing recommendation: Continue current regimen    Pt educated to contact our clinic with any changes in medications or s/s of bleeding or thrombosis. Pt is aware to seek immediate medical attention for falls, head injury or deep cuts.    Follow up appointment in 6 week(s) per pt's request.    SHANNAN Garcia      None

## 2021-09-14 ENCOUNTER — TELEPHONE (OUTPATIENT)
Dept: HEALTH INFORMATION MANAGEMENT | Facility: OTHER | Age: 66
End: 2021-09-14

## 2021-10-07 ENCOUNTER — TELEPHONE (OUTPATIENT)
Dept: VASCULAR LAB | Facility: MEDICAL CENTER | Age: 66
End: 2021-10-07

## 2021-10-07 NOTE — TELEPHONE ENCOUNTER
[facility]    Pt missed their last appointment. Unable to leave message with pt.  Left VM with emergency contact for pt to reschedule.    Ray Brizuela, PharmD

## 2021-10-15 ENCOUNTER — TELEPHONE (OUTPATIENT)
Dept: VASCULAR LAB | Facility: MEDICAL CENTER | Age: 66
End: 2021-10-15

## 2021-10-15 NOTE — LETTER
Annamaria Jorgensen  [address]  [address]  [place] [address]    10/15/21    Dear Annamaria Jorgensen ,    We have been unsuccessful in our attempts to contact you regarding your Anticoagulation Service appointments. Warfarin is a potent blood-thinning agent that requires monitoring to ensure that the dosage is correct for your body.  If it isn't, you could develop serious, sometimes life-threatening bleeding problems or life-threatening blood clots or stroke could result.    To monitor you effectively, we need to be able to communicate with you.  This is a requirement to be followed by our Service.       If you repeatedly fail to keep your lab appointments, you are at risk of being discharged from the Anticoagulation Service.    It is extremely important that you contact the clinic as soon as possible to arrange appropriate follow up.  We are open Monday-Friday 8 am until 5 pm.  You may reach our Service at [phone].           Sincerely,           Ray Brizuela, PharmD, [facility]  Clinic Supervisor  [facility]  Outpatient Anticoagulation Service

## 2021-11-11 ENCOUNTER — TELEPHONE (OUTPATIENT)
Dept: VASCULAR LAB | Facility: MEDICAL CENTER | Age: 66
End: 2021-11-11

## 2021-11-11 NOTE — TELEPHONE ENCOUNTER
Unable to leave VM message for patient to get INR checked.  Our number remains blocked.  2nd letter sent  Annamraia Lopez, Clinical Pharmacist, CDE, CACP

## 2021-11-11 NOTE — PROGRESS NOTES
Anticoagulation clinic    Reminder voice message for patient regarding getting INR done ASAP for anticoagulation clinic.     Kvng Mclaughlin, PharmD                  [As Noted in HPI] : as noted in HPI [Abdominal Pain] : abdominal pain [Negative] : Heme/Lymph

## 2021-11-11 NOTE — LETTER
Annamaria Jorgensen  [address]  [address]  [place] [address]    11/11/21    Dear Annamaria Jorgensen ,    We have been unsuccessful in our attempts to contact you regarding your Anticoagulation Service appointments. Warfarin is a potent blood-thinning agent that requires monitoring to ensure that the dosage is correct for your body.  If it isn't, you could develop serious, sometimes life-threatening bleeding problems or life-threatening blood clots or stroke could result.    To monitor you effectively, we need to be able to communicate with you.  This is a requirement to be followed by our Service.       If you repeatedly fail to keep your lab appointments, you are at risk of being discharged from the Anticoagulation Service.    It is extremely important that you contact the clinic as soon as possible to arrange appropriate follow up.  We are open Monday-Friday 8 am until 5 pm.  You may reach our Service at [phone].           Sincerely,           Ray Brizuela, PharmD, [facility]  Clinic Supervisor  [facility]  Outpatient Anticoagulation Service

## 2021-12-01 DIAGNOSIS — Z79.01 CHRONIC ANTICOAGULATION: ICD-10-CM

## 2021-12-01 RX ORDER — WARFARIN SODIUM 3 MG/1
TABLET ORAL
Qty: 30 TABLET | Refills: 0 | Status: SHIPPED | OUTPATIENT
Start: 2021-12-01 | End: 2022-01-12

## 2021-12-21 ENCOUNTER — DOCUMENTATION (OUTPATIENT)
Dept: VASCULAR LAB | Facility: MEDICAL CENTER | Age: 66
End: 2021-12-21

## 2021-12-21 NOTE — LETTER
Annamaria Jorgensen  [address]  [place] [address]    12/21/21      Dear Annamaria Jorgensen,    We have been unsuccessful in our attempts to contact you regarding your Anticoagulation Service appointments and overdue INR testing.  Warfarin is a potent blood-thinning agent that requires frequent monitoring to measure its level in the blood.  If your level becomes too high, you could develop serious, sometimes life-threatening bleeding problems.  If your level becomes too low, life-threatening blood clots or stroke could result.     The last recorded INR that we have on file for you is:  INR   Date Value Ref Range Status   08/26/2021 2.90 2 - 3.5 Final      To monitor your warfarin effectively, we need to be able to communicate with you.  This is a requirement to be followed by our Service.  If we are unable to contact you through repeated occasions, you are at risk of being discharged from the Anticoagulation Service.     It is extremely important that you have your lab work drawn as soon as possible.  Alternatively, you may schedule an appointment for a fingerstick INR at our clinic.  We are open Monday-Friday 8 am until 5 pm.  You may reach our Service at [phone].        Sincerely,           Ray Brizuela, PharmD, BCPS  Clinic Supervisor  [facility]  Outpatient Anticoagulation Services

## 2021-12-21 NOTE — PROGRESS NOTES
[facility]      Pt is over due for PT/INR for warfarin monitoring.   Left message for patient to have INR checked ASAP or to call the clinic to schedule an appt.      Clinic phone number left for any questions or concerns.  3rd letter sent today.        Alondra MaxwellD

## 2022-01-03 ENCOUNTER — ANTICOAGULATION VISIT (OUTPATIENT)
Dept: VASCULAR LAB | Facility: MEDICAL CENTER | Age: 67
End: 2022-01-03
Attending: INTERNAL MEDICINE
Payer: MEDICARE

## 2022-01-03 DIAGNOSIS — Z86.711 HISTORY OF PULMONARY EMBOLUS (PE): ICD-10-CM

## 2022-01-03 LAB
INR BLD: 2.5 (ref 0.9–1.2)
INR PPP: 2.5 (ref 2–3.5)

## 2022-01-03 PROCEDURE — 99211 OFF/OP EST MAY X REQ PHY/QHP: CPT | Performed by: NURSE PRACTITIONER

## 2022-01-03 PROCEDURE — 85610 PROTHROMBIN TIME: CPT

## 2022-01-03 NOTE — PROGRESS NOTES
Anticoagulation Summary  As of 1/3/2022    INR goal:  2.0-3.0   TTR:  53.1 % (6.5 y)   INR used for dosin.50 (1/3/2022)   Warfarin maintenance plan:  3 mg (3 mg x 1) every day   Weekly warfarin total:  21 mg   Plan last modified:  SHANNAN Garcia (2021)   Next INR check:  3/2/2022   Target end date:  Indefinite    Indications    History of pulmonary embolus (PE) [Z86.711]             Anticoagulation Episode Summary     INR check location:  Anticoagulation Clinic    Preferred lab:      Send INR reminders to:      Comments:  *she has a non-renown PCP fax [phone]*      Anticoagulation Care Providers     Provider Role Specialty Phone number    Aron Patel M.D. Referring Internal Medicine [phone]    [facility] Responsible  [phone]                Refer to Patient Findings for HPI:  Patient Findings     Negatives:  Signs/symptoms of thrombosis, Signs/symptoms of bleeding, Laboratory test error suspected, Change in health, Change in alcohol use, Change in activity, Upcoming invasive procedure, Emergency department visit, Upcoming dental procedure, Extra doses, Change in medications, Change in diet/appetite, Hospital admission, Bruising, Other complaints    Comments:  Hasn't had her INR checked since August.          There were no vitals filed for this visit.   pt declined vitals    Verified current warfarin dosing schedule.    Medications reconciled   Pt is not on antiplatelet therapy      A/P   INR  -therapeutic.     Warfarin dosing recommendation: Continue current regimen.  Reminded about the importance of regular INR follow up. Pt states she will be better about having her INR checked.    Pt educated to contact our clinic with any changes in medications or s/s of bleeding or thrombosis. Pt is aware to seek immediate medical attention for falls, head injury or deep cuts.    Follow up appointment in 8 week(s).    SHANNAN Garcia

## 2022-01-04 DIAGNOSIS — Z79.01 CHRONIC ANTICOAGULATION: ICD-10-CM

## 2022-01-11 DIAGNOSIS — Z79.01 CHRONIC ANTICOAGULATION: ICD-10-CM

## 2022-01-12 RX ORDER — WARFARIN SODIUM 3 MG/1
TABLET ORAL
Qty: 90 TABLET | Refills: 0 | Status: SHIPPED
Start: 2022-01-12 | End: 2022-10-10

## 2022-03-02 ENCOUNTER — APPOINTMENT (OUTPATIENT)
Dept: VASCULAR LAB | Facility: MEDICAL CENTER | Age: 67
End: 2022-03-02
Payer: MEDICARE

## 2022-03-03 ENCOUNTER — TELEPHONE (OUTPATIENT)
Dept: HEALTH INFORMATION MANAGEMENT | Facility: OTHER | Age: 67
End: 2022-03-03

## 2022-03-04 ENCOUNTER — DOCUMENTATION (OUTPATIENT)
Dept: VASCULAR LAB | Facility: MEDICAL CENTER | Age: 67
End: 2022-03-04
Payer: MEDICARE

## 2022-03-05 NOTE — PROGRESS NOTES
[facility]    Left voicemail message for patient to call clinic regarding missed INR appointment today. Left clinic phone number in message.    Ana M Barbour, PharmD  PGY1 Pharmacy Practice Resident

## 2022-03-17 ENCOUNTER — TELEPHONE (OUTPATIENT)
Dept: VASCULAR LAB | Facility: MEDICAL CENTER | Age: 67
End: 2022-03-17
Payer: MEDICARE

## 2022-03-31 ENCOUNTER — TELEPHONE (OUTPATIENT)
Dept: VASCULAR LAB | Facility: MEDICAL CENTER | Age: 67
End: 2022-03-31
Payer: MEDICARE

## 2022-03-31 NOTE — TELEPHONE ENCOUNTER
Left message for pt to have INR checked  2nd call  Letter sent  Annamaria Lopez, Clinical Pharmacist, CDE, CACP

## 2022-03-31 NOTE — LETTER
Annamaria Jorgensen  [address]  [place] [address]    03/31/22    Dear Annamaria Jorgensen ,    We have been unsuccessful in our attempts to contact you regarding your Anticoagulation Service appointments. Warfarin is a potent blood-thinning agent that requires monitoring to ensure that the dosage is correct for your body.  If it isn't, you could develop serious, sometimes life-threatening bleeding problems or life-threatening blood clots or stroke could result.    To monitor you effectively, we need to be able to communicate with you.  This is a requirement to be followed by our Service.       If you repeatedly fail to keep your lab appointments, you are at risk of being discharged from the Anticoagulation Service.    It is extremely important that you contact the clinic as soon as possible to arrange appropriate follow up.  We are open Monday-Friday 8 am until 5 pm.  You may reach our Service at [phone].           Sincerely,           Ray Brizuela, PharmD, [facility]  Clinic Supervisor  [facility]  Outpatient Anticoagulation Service

## 2022-04-05 ENCOUNTER — TELEPHONE (OUTPATIENT)
Dept: CARDIOLOGY | Facility: MEDICAL CENTER | Age: 67
End: 2022-04-05
Payer: MEDICARE

## 2022-04-05 NOTE — TELEPHONE ENCOUNTER
Left vm message to reschedule missed anticoagulation services visit  Annamaria Lopez, Clinical Pharmacist, CDE, CACP

## 2022-05-04 ENCOUNTER — TELEPHONE (OUTPATIENT)
Dept: VASCULAR LAB | Facility: MEDICAL CENTER | Age: 67
End: 2022-05-04
Payer: MEDICARE

## 2022-05-04 NOTE — TELEPHONE ENCOUNTER
Left message for pt to have INR checked  3rd call  2nd letter sent.  INR   Date Value Ref Range Status   01/03/2022 2.50 2 - 3.5 Final     POC INR   Date Value Ref Range Status   01/03/2022 2.5 (H) 0.9 - 1.2 Final     Comment:     INR - Non-therapeutic Reference Range: 0.9-1.2  INR - Therapeutic Reference Range: 2.0-4.0       Annamaria Lopez, Clinical Pharmacist, CDE, CACP

## 2022-05-04 NOTE — LETTER
Annamaria Jorgensen  [address]  [place] [address]    05/04/22    Dear Annamaria Jorgensen ,    We have been unsuccessful in our attempts to contact you regarding your Anticoagulation Service appointments. Warfarin is a potent blood-thinning agent that requires monitoring to ensure that the dosage is correct for your body.  If it isn't, you could develop serious, sometimes life-threatening bleeding problems or life-threatening blood clots or stroke could result.    To monitor you effectively, we need to be able to communicate with you.  This is a requirement to be followed by our Service.       If you repeatedly fail to keep your lab appointments, you are at risk of being discharged from the Anticoagulation Service.    It is extremely important that you contact the clinic as soon as possible to arrange appropriate follow up.  We are open Monday-Friday 8 am until 5 pm.  You may reach our Service at [phone].           Sincerely,           Ray Brizuela, PharmD, [facility]  Clinic Supervisor  [facility]  Outpatient Anticoagulation Service

## 2022-05-06 ENCOUNTER — ANTICOAGULATION MONITORING (OUTPATIENT)
Dept: VASCULAR LAB | Facility: MEDICAL CENTER | Age: 67
End: 2022-05-06
Payer: MEDICARE

## 2022-05-06 DIAGNOSIS — Z86.711 HISTORY OF PULMONARY EMBOLUS (PE): ICD-10-CM

## 2022-05-06 NOTE — PROGRESS NOTES
Discharged from [facility].  Secondary to non compliance/non adherence    Annamaria Lopez, Clinical Pharmacist, CDE, CACP

## 2022-06-17 ENCOUNTER — TELEPHONE (OUTPATIENT)
Dept: HEALTH INFORMATION MANAGEMENT | Facility: OTHER | Age: 67
End: 2022-06-17

## 2022-10-10 ENCOUNTER — OFFICE VISIT (OUTPATIENT)
Dept: MEDICAL GROUP | Facility: MEDICAL CENTER | Age: 67
End: 2022-10-10
Payer: MEDICARE

## 2022-10-10 ENCOUNTER — PHARMACY VISIT (OUTPATIENT)
Dept: PHARMACY | Facility: MEDICAL CENTER | Age: 67
End: 2022-10-10
Payer: COMMERCIAL

## 2022-10-10 VITALS
HEIGHT: 65 IN | RESPIRATION RATE: 16 BRPM | OXYGEN SATURATION: 95 % | HEART RATE: 80 BPM | BODY MASS INDEX: 27.49 KG/M2 | TEMPERATURE: 98.2 F | SYSTOLIC BLOOD PRESSURE: 122 MMHG | DIASTOLIC BLOOD PRESSURE: 70 MMHG | WEIGHT: 165 LBS

## 2022-10-10 DIAGNOSIS — Z23 NEED FOR VACCINATION: ICD-10-CM

## 2022-10-10 DIAGNOSIS — Z86.711 HISTORY OF PULMONARY EMBOLUS (PE): ICD-10-CM

## 2022-10-10 DIAGNOSIS — Z00.00 WELLNESS EXAMINATION: ICD-10-CM

## 2022-10-10 DIAGNOSIS — Z12.31 ENCOUNTER FOR SCREENING MAMMOGRAM FOR MALIGNANT NEOPLASM OF BREAST: ICD-10-CM

## 2022-10-10 DIAGNOSIS — R73.01 IMPAIRED FASTING GLUCOSE: ICD-10-CM

## 2022-10-10 DIAGNOSIS — Z78.0 MENOPAUSE: ICD-10-CM

## 2022-10-10 DIAGNOSIS — Z98.84 S/P LAPAROSCOPIC SLEEVE GASTRECTOMY: ICD-10-CM

## 2022-10-10 DIAGNOSIS — Z12.11 COLON CANCER SCREENING: ICD-10-CM

## 2022-10-10 DIAGNOSIS — F33.0 MILD EPISODE OF RECURRENT MAJOR DEPRESSIVE DISORDER (HCC): ICD-10-CM

## 2022-10-10 DIAGNOSIS — D68.9 COAGULATION DEFECT (HCC): ICD-10-CM

## 2022-10-10 LAB
HBA1C MFR BLD: 6 % (ref 0–5.6)
INT CON NEG: NEGATIVE
INT CON POS: POSITIVE

## 2022-10-10 PROCEDURE — 99214 OFFICE O/P EST MOD 30 MIN: CPT | Mod: 25 | Performed by: STUDENT IN AN ORGANIZED HEALTH CARE EDUCATION/TRAINING PROGRAM

## 2022-10-10 PROCEDURE — G0008 ADMIN INFLUENZA VIRUS VAC: HCPCS | Performed by: STUDENT IN AN ORGANIZED HEALTH CARE EDUCATION/TRAINING PROGRAM

## 2022-10-10 PROCEDURE — RXMED WILLOW AMBULATORY MEDICATION CHARGE: Performed by: STUDENT IN AN ORGANIZED HEALTH CARE EDUCATION/TRAINING PROGRAM

## 2022-10-10 PROCEDURE — 90662 IIV NO PRSV INCREASED AG IM: CPT | Performed by: STUDENT IN AN ORGANIZED HEALTH CARE EDUCATION/TRAINING PROGRAM

## 2022-10-10 PROCEDURE — 83036 HEMOGLOBIN GLYCOSYLATED A1C: CPT | Performed by: STUDENT IN AN ORGANIZED HEALTH CARE EDUCATION/TRAINING PROGRAM

## 2022-10-10 RX ORDER — BUPROPION HYDROCHLORIDE 150 MG/1
150 TABLET ORAL EVERY MORNING
Qty: 14 TABLET | Refills: 0 | Status: SHIPPED | OUTPATIENT
Start: 2022-10-10 | End: 2022-10-10

## 2022-10-10 RX ORDER — BUPROPION HYDROCHLORIDE 150 MG/1
150 TABLET ORAL EVERY MORNING
Qty: 14 TABLET | Refills: 0 | Status: SHIPPED | OUTPATIENT
Start: 2022-10-10 | End: 2023-01-27

## 2022-10-10 RX ORDER — BUPROPION HYDROCHLORIDE 300 MG/1
300 TABLET ORAL EVERY MORNING
Qty: 90 TABLET | Refills: 3 | Status: SHIPPED | OUTPATIENT
Start: 2022-10-10 | End: 2023-10-05 | Stop reason: SDUPTHER

## 2022-10-10 ASSESSMENT — PATIENT HEALTH QUESTIONNAIRE - PHQ9
5. POOR APPETITE OR OVEREATING: 1 - SEVERAL DAYS
CLINICAL INTERPRETATION OF PHQ2 SCORE: 2
SUM OF ALL RESPONSES TO PHQ QUESTIONS 1-9: 9

## 2022-10-10 ASSESSMENT — FIBROSIS 4 INDEX: FIB4 SCORE: 1.54
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
## 2022-10-11 ENCOUNTER — DOCUMENTATION (OUTPATIENT)
Dept: VASCULAR LAB | Facility: MEDICAL CENTER | Age: 67
End: 2022-10-11
Payer: MEDICARE

## 2022-10-18 ENCOUNTER — DOCUMENTATION (OUTPATIENT)
Dept: VASCULAR LAB | Facility: MEDICAL CENTER | Age: 67
End: 2022-10-18
Payer: MEDICARE

## 2022-10-25 ENCOUNTER — DOCUMENTATION (OUTPATIENT)
Dept: VASCULAR LAB | Facility: MEDICAL CENTER | Age: 67
End: 2022-10-25
Payer: MEDICARE

## 2022-10-26 ENCOUNTER — DOCUMENTATION (OUTPATIENT)
Dept: VASCULAR LAB | Facility: MEDICAL CENTER | Age: 67
End: 2022-10-26
Payer: MEDICARE

## 2022-10-27 NOTE — PROGRESS NOTES
Patient referred to vascular care  Unfortunately our schedulers have been unable to reach patient despite multiple attempts  Unless patient establishes with a face-to-face visit in our office will be unable to take part in care  Await further patient contact.  Pending further contact, we will defer all further management of vascular disease and its risk factors to PCP and/or referring MD.    Michael J. Bloch, MD  Vascular Care    cc:   IJEOMA Acosta

## 2023-01-23 PROCEDURE — RXMED WILLOW AMBULATORY MEDICATION CHARGE: Performed by: STUDENT IN AN ORGANIZED HEALTH CARE EDUCATION/TRAINING PROGRAM

## 2023-01-24 ENCOUNTER — PHARMACY VISIT (OUTPATIENT)
Dept: PHARMACY | Facility: MEDICAL CENTER | Age: 68
End: 2023-01-24
Payer: COMMERCIAL

## 2023-01-27 ENCOUNTER — OFFICE VISIT (OUTPATIENT)
Dept: MEDICAL GROUP | Facility: MEDICAL CENTER | Age: 68
End: 2023-01-27
Payer: MEDICARE

## 2023-01-27 ENCOUNTER — PHARMACY VISIT (OUTPATIENT)
Dept: PHARMACY | Facility: MEDICAL CENTER | Age: 68
End: 2023-01-27
Payer: COMMERCIAL

## 2023-01-27 VITALS
SYSTOLIC BLOOD PRESSURE: 128 MMHG | TEMPERATURE: 98 F | HEIGHT: 65 IN | HEART RATE: 77 BPM | DIASTOLIC BLOOD PRESSURE: 62 MMHG | OXYGEN SATURATION: 98 % | WEIGHT: 165 LBS | BODY MASS INDEX: 27.49 KG/M2

## 2023-01-27 DIAGNOSIS — R30.0 DYSURIA: ICD-10-CM

## 2023-01-27 DIAGNOSIS — N30.01 ACUTE CYSTITIS WITH HEMATURIA: ICD-10-CM

## 2023-01-27 LAB
APPEARANCE UR: CLEAR
BILIRUB UR STRIP-MCNC: NORMAL MG/DL
COLOR UR AUTO: YELLOW
GLUCOSE UR STRIP.AUTO-MCNC: NORMAL MG/DL
KETONES UR STRIP.AUTO-MCNC: NORMAL MG/DL
LEUKOCYTE ESTERASE UR QL STRIP.AUTO: NORMAL
NITRITE UR QL STRIP.AUTO: NORMAL
PH UR STRIP.AUTO: 6.5 [PH] (ref 5–8)
PROT UR QL STRIP: NORMAL MG/DL
RBC UR QL AUTO: NORMAL
SP GR UR STRIP.AUTO: 1.01
UROBILINOGEN UR STRIP-MCNC: 0.2 MG/DL

## 2023-01-27 PROCEDURE — 81002 URINALYSIS NONAUTO W/O SCOPE: CPT

## 2023-01-27 PROCEDURE — 99213 OFFICE O/P EST LOW 20 MIN: CPT

## 2023-01-27 PROCEDURE — RXMED WILLOW AMBULATORY MEDICATION CHARGE

## 2023-01-27 RX ORDER — SULFAMETHOXAZOLE AND TRIMETHOPRIM 800; 160 MG/1; MG/1
1 TABLET ORAL EVERY 12 HOURS
Qty: 6 TABLET | Refills: 0 | Status: SHIPPED | OUTPATIENT
Start: 2023-01-27 | End: 2023-01-30

## 2023-01-27 RX ORDER — ASPIRIN 81 MG/1
81 TABLET, CHEWABLE ORAL DAILY
COMMUNITY

## 2023-01-27 RX ORDER — PHENAZOPYRIDINE HYDROCHLORIDE 200 MG/1
200 TABLET, FILM COATED ORAL 3 TIMES DAILY PRN
Qty: 6 TABLET | Refills: 0 | Status: SHIPPED | OUTPATIENT
Start: 2023-01-27 | End: 2023-01-29

## 2023-01-27 ASSESSMENT — PATIENT HEALTH QUESTIONNAIRE - PHQ9: CLINICAL INTERPRETATION OF PHQ2 SCORE: 0

## 2023-01-27 NOTE — PROGRESS NOTES
"Chief Complaint   Patient presents with    Cystitis     Pain in bladder area x 3 days       HPI:  Symptom onset: 3 days ago   Current symptoms: abdominal pain, urgent, frequent voids, chills, low back pain on the left. No blood noted in urine.  Since onset symptoms are: Unchanged  Treatments tried: OTC antispasm med.  Associated symptoms: Negative for fever, flank pain, nausea and vomiting, vaginal discharge, pelvic pain.  History is positive for frequent UTI.     ROS:  Denies fever, vomiting.     OBJECTIVE:  /62 (BP Location: Left arm, Patient Position: Sitting, BP Cuff Size: Adult)   Pulse 77   Temp 36.7 °C (98 °F) (Temporal)   Ht 1.651 m (5' 5\")   Wt 74.8 kg (165 lb)   SpO2 98%   Gen: Alert, NAD.  Chest: Lungs clear to auscultation, CV RRR.  Abdomen: Soft, tender in suprapubic region. No CVAT. Normal bowel sounds.     Lab Results   Component Value Date    POCCOLOR YELLOW 01/27/2023    POCAPPEAR CLEAR 01/27/2023    POCLEUKEST TRACE 01/27/2023    POCNITRITE NEG 01/27/2023    POCUROBILIGE 0.2 01/27/2023    POCPROTEIN NEG 01/27/2023    POCURPH 6.5 01/27/2023    POCBLOOD TRACE-INTACT 01/27/2023    POCSPGRV 1.015 01/27/2023    POCKETONES NEG 01/27/2023    POCBILIRUBIN NEG 01/27/2023    POCGLUCUA NEG 01/27/2023          ASSESSMENT/PLAN:     1. Acute cystitis with hematuria    2. Dysuria          1. Abnormal urine dipstick in office. Start antibiotics.  Patient has used Bactrim in the past, I will send Bactrim today.  2. Provided education to drink plenty of fluids, wipe front to back every void and bowel movement.   3. Return to clinic if symptoms not improving within 3-4 days or in case of vomiting, fever, increasing pain.    "

## 2023-01-27 NOTE — PATIENT INSTRUCTIONS
Urinary Tract Infection, Adult  A urinary tract infection (UTI) is an infection of any part of the urinary tract. The urinary tract includes:  The kidneys.  The ureters.  The bladder.  The urethra.  These organs make, store, and get rid of pee (urine) in the body.  What are the causes?  This is caused by germs (bacteria) in your genital area. These germs grow and cause swelling (inflammation) of your urinary tract.  What increases the risk?  You are more likely to develop this condition if:  You have a small, thin tube (catheter) to drain pee.  You cannot control when you pee or poop (incontinence).  You are female, and:  You use these methods to prevent pregnancy:  A medicine that kills sperm (spermicide).  A device that blocks sperm (diaphragm).  You have low levels of a female hormone (estrogen).  You are pregnant.  You have genes that add to your risk.  You are sexually active.  You take antibiotic medicines.  You have trouble peeing because of:  A prostate that is bigger than normal, if you are male.  A blockage in the part of your body that drains pee from the bladder (urethra).  A kidney stone.  A nerve condition that affects your bladder (neurogenic bladder).  Not getting enough to drink.  Not peeing often enough.  You have other conditions, such as:  Diabetes.  A weak disease-fighting system (immune system).  Sickle cell disease.  Gout.  Injury of the spine.  What are the signs or symptoms?  Symptoms of this condition include:  Needing to pee right away (urgently).  Peeing often.  Peeing small amounts often.  Pain or burning when peeing.  Blood in the pee.  Pee that smells bad or not like normal.  Trouble peeing.  Pee that is cloudy.  Fluid coming from the vagina, if you are female.  Pain in the belly or lower back.  Other symptoms include:  Throwing up (vomiting).  No urge to eat.  Feeling mixed up (confused).  Being tired and grouchy (irritable).  A fever.  Watery poop (diarrhea).  How is this  treated?  This condition may be treated with:  Antibiotic medicine.  Other medicines.  Drinking enough water.  Follow these instructions at home:    Medicines  Take over-the-counter and prescription medicines only as told by your doctor.  If you were prescribed an antibiotic medicine, take it as told by your doctor. Do not stop taking it even if you start to feel better.  General instructions  Make sure you:  Pee until your bladder is empty.  Do not hold pee for a long time.  Empty your bladder after sex.  Wipe from front to back after pooping if you are a female. Use each tissue one time when you wipe.  Drink enough fluid to keep your pee pale yellow.  Keep all follow-up visits as told by your doctor. This is important.  Contact a doctor if:  You do not get better after 1-2 days.  Your symptoms go away and then come back.  Get help right away if:  You have very bad back pain.  You have very bad pain in your lower belly.  You have a fever.  You are sick to your stomach (nauseous).  You are throwing up.  Summary  A urinary tract infection (UTI) is an infection of any part of the urinary tract.  This condition is caused by germs in your genital area.  There are many risk factors for a UTI. These include having a small, thin tube to drain pee and not being able to control when you pee or poop.  Treatment includes antibiotic medicines for germs.  Drink enough fluid to keep your pee pale yellow.  This information is not intended to replace advice given to you by your health care provider. Make sure you discuss any questions you have with your health care provider.  Document Released: 06/05/2009 Document Revised: 12/05/2019 Document Reviewed: 06/27/2019  [employer] Patient Education © 2020 [employer]

## 2023-03-07 ENCOUNTER — TELEPHONE (OUTPATIENT)
Dept: HEALTH INFORMATION MANAGEMENT | Facility: OTHER | Age: 68
End: 2023-03-07
Payer: MEDICARE

## 2023-04-10 PROCEDURE — RXMED WILLOW AMBULATORY MEDICATION CHARGE: Performed by: STUDENT IN AN ORGANIZED HEALTH CARE EDUCATION/TRAINING PROGRAM

## 2023-04-17 ENCOUNTER — PHARMACY VISIT (OUTPATIENT)
Dept: PHARMACY | Facility: MEDICAL CENTER | Age: 68
End: 2023-04-17
Payer: COMMERCIAL

## 2023-05-15 ENCOUNTER — TELEPHONE (OUTPATIENT)
Dept: HEALTH INFORMATION MANAGEMENT | Facility: OTHER | Age: 68
End: 2023-05-15
Payer: MEDICARE

## 2023-07-12 ENCOUNTER — PHARMACY VISIT (OUTPATIENT)
Dept: PHARMACY | Facility: MEDICAL CENTER | Age: 68
End: 2023-07-12
Payer: COMMERCIAL

## 2023-07-12 PROCEDURE — RXMED WILLOW AMBULATORY MEDICATION CHARGE: Performed by: STUDENT IN AN ORGANIZED HEALTH CARE EDUCATION/TRAINING PROGRAM

## 2023-10-05 DIAGNOSIS — F33.0 MILD EPISODE OF RECURRENT MAJOR DEPRESSIVE DISORDER (HCC): ICD-10-CM

## 2023-10-06 PROCEDURE — RXMED WILLOW AMBULATORY MEDICATION CHARGE: Performed by: STUDENT IN AN ORGANIZED HEALTH CARE EDUCATION/TRAINING PROGRAM

## 2023-10-06 RX ORDER — BUPROPION HYDROCHLORIDE 300 MG/1
300 TABLET ORAL EVERY MORNING
Qty: 90 TABLET | Refills: 3 | Status: SHIPPED | OUTPATIENT
Start: 2023-10-06

## 2023-10-11 ENCOUNTER — PHARMACY VISIT (OUTPATIENT)
Dept: PHARMACY | Facility: MEDICAL CENTER | Age: 68
End: 2023-10-11
Payer: COMMERCIAL

## 2023-12-06 NOTE — PROGRESS NOTES
"Transplant Admission Psychosocial Assessment    Patient Name: Taylor Valiente  : 1998  Age: 25 year old  MRN: [id number]  Date of Initial Social Work Evaluation: 2021     Patient underwent Kidney transplant.  Met with patient to update psychosocial assessment and provide brief education about SW role while inpatient, as well as expectations/requirements and follow up needs post-transplant. SW also provided education about need for compliance with transplant medications, and explained ESRD Medicare benefits and medication coverage under Medicare part B.  Medicare 2728 forms completed and signed by patient.      Presenting Information   Living Situation: Taylor lives in [place], [place] with his parents and grandfather.     If not local, plans for short term stay:  NA     Previous Functional Status: Independent ADL's     Cultural/Language/Spiritual Considerations:  Male     Support System  Primary Support Person  His motherChevy     Other support:  Father, sister     Plan for support in immediate post-transplant period: Taylor's parents and grandfather were at the bedside. His mother will be primary support person but he has a strong family support network.     Health Care Directive  Decision Maker: Self     Alternate Decision Maker: NOK-parents     Health Care Directive: Provided education    Mental Health/Coping:   History of Mental Health: Taylor denied any current mental health concerns since his transplant. Per review (3/21/2023): \"Taylor indicated he believes his depression and anxiety is under control. Taylor did complete six months of therapy and he signed ROSALIA.  Writer reviewed therapy notes and talked to his therapist JOHNATHAN Dobbs, [facility], who indicated six months was completed.\"    History of Chemical Health: Taylor denied any previous or current chemical use. Per review, he oes use medical marijuana. He has a card.    Current status: Feels " Anticoagulation Summary  As of 1/10/2018    INR goal:   2.0-3.0   TTR:   73.9 % (2.5 y)   Today's INR:   1.6!   Maintenance plan:   2 mg (2 mg x 1) on Sun, Wed; 3 mg (2 mg x 1.5) all other days   Weekly total:   19 mg   Weekly max dose:   20 mg   Plan last modified:   SHANNAN Garcia (5/4/2016)   Next INR check:   2/7/2018   Target end date:   Indefinite    Indications    Pulmonary embolism (CMS-HCC) [I26.99]             Anticoagulation Episode Summary     INR check location:   [facility]    Preferred lab:       Send INR reminders to:       Comments:   *she has a non-[facility] PCP fax [phone]*      Anticoagulation Care Providers     Provider Role Specialty Phone number    Aron Patel M.D. Referring Internal Medicine [phone]    [facility] Responsible  [phone]        Anticoagulation Patient Findings      HPI:  Annamaria Hutton Jameel seen in clinic today, on anticoagulation therapy with warfarin for Hx PE  Changes to current medical/health status since last appt: pt missed a dose  Denies signs/symptoms of bleeding and/or thrombosis since the last appt.    Denies any interval changes to diet  Denies any interval changes to medications since last appt.   Denies any complications or cost restrictions with current therapy.   BP declined      A/P   INR  is sub-therapeutic.   Will have pt take a boost dose of 4mg x1 today and then resume her normal weekly dosing.     Follow up appointment in 4 week(s) per pt preference.    Evelina Gutierrez, PharmD          good post this transplant despite general discomfort     Coping: Will listen to music or speak with family/friends. He is coping appropriately post transplant surgery.    Services Needed/Recommended: NA    Financial   Income: SSDI  Impact of transplant on income: Discussed he may no longer be eligible for disability one year post successful transplant.    Insurance and medication coverage:     SOTBROOKLYNN Flores) IS A (LEFT KIDNEY) TRANSPLANT PATIENT  (DATE OF TRANSPLANT: 12/05/2023 )    HEALTH BENEFIT: MEDICARE    ID# [id number] (PART A EFFECTIVE 03/01/2023 , PART B EFFECTIVE 03/01/2023 )    PROCESSING INFO: ID# [id number]# OTHER BIN# [id number]    PT WILL PAY 20% COST OF MEDICATION AT TIME OF SERVICE       PHARMACY BENEFIT: CASEY PART D    PROCESSING INFO: ID# [id number] GRP# [id number] PCN# [id number] BIN# [id number] (EFFECTIVE (05/01/2023)    NO DEDUCTIBLE OR MAX OUT-OF-POCKET DUE TO LOW-INCOME SUBSIDY    COPAY STRUCTURE:    $ 1.45 FOR GENERIC    $ 4.30 FOR BRAND    TEST CLAIM SPECIALTY #28    MYCOPHENOLATE 250mg (#240/30DS)--estimated cost after Part B = $121    PROGRAF 1mg (#120/30DS)---estimated cost after Part B = $214    TACROLIMUS 1mg (#120/30DS)--- estimated cost after Part B = $80    CYCLOSPORINE 100mg (#60/30DS)--- estimated cost after Part B = $54    VALGANCICLOVIR 450mg (#60/30DS)--- product not covered at this location    VALACYCLOVIR 1gm (#90/30DS)---product not covered at this location       TEST CLAIM DISCHARGE #27    MYCOPHENOLATE 250mg (#240/30DS)--- estimated cost after Part B = $121    PROGRAF 1mg (#120/30DS)--- estimated cost after Part B = $214    TACROLIMUS 1mg (#120/30DS)--- estimated cost after Part B = $80    CYCLOSPORINE 100mg (#60/30DS)--- estimated cost after Part B = $54    VALGANCICLOVIR 450mg (#60/30DS)---Product not covered at this location    VALACYCLOVIR 1gm (#90/30DS)--- Product not covered at this location       Financial concerns: Taylor enrolled in Medicare while on dialysis.  He notes that he was on [place] but this lapsed on 11/30/23.     Resources needed: Psychoeducation on ESRD Medicare and [place]    Education provided by [facility]: Social Work role inpatient setting, availability of support groups, parking information and psychosocial support.      Assessment and recommendations and plan:      Taylor Valiente is a 25 year old male admitted for preoperative kidney transplant evaluation. 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      SW met with Taylor, mother, father and grandfather at the bedside. We discussed his surgery and new quality of life moving forward. Taylor lives with his parents and grandfather in [place], [place]. He has one sister. He is single, no children. Taylor is not working, on SSDI due to ESRD (discussed losing SSDI one year post).     He does have ESRD Medicare at present. He was on his mother's employer insurance additionally until he aged out at 24. He notes that after he aged out, he got on MA-EPD in addition to Medicare A and B. At the time of visit, his medications were not yet ran to clarify questions. [facility] received update that his [place] coverage lapsed on 11/30/2023. SW to meet with family on 12/7/2023 to assist him with reinstating [place] and providing additional education. [facility] will continue to follow for psychosocial support, resources and advocate on behalf of the patient.     JOHNATHAN Lala, [facility]  Outpatient Kidney/Pancreas/[facility] Islet Transplant Program   [address]  [place] [address]  [email or web address]  [email or web address]  Office: [phone] I Fax: [phone]

## 2024-01-02 ENCOUNTER — OFFICE VISIT (OUTPATIENT)
Dept: MEDICAL GROUP | Facility: MEDICAL CENTER | Age: 69
End: 2024-01-02
Payer: MEDICARE

## 2024-01-02 VITALS
HEART RATE: 84 BPM | OXYGEN SATURATION: 93 % | RESPIRATION RATE: 16 BRPM | HEIGHT: 65 IN | BODY MASS INDEX: 27.46 KG/M2 | TEMPERATURE: 98.9 F | DIASTOLIC BLOOD PRESSURE: 60 MMHG | SYSTOLIC BLOOD PRESSURE: 130 MMHG

## 2024-01-02 DIAGNOSIS — R68.89 FLU-LIKE SYMPTOMS: ICD-10-CM

## 2024-01-02 DIAGNOSIS — R05.9 COUGH, UNSPECIFIED TYPE: ICD-10-CM

## 2024-01-02 DIAGNOSIS — L98.9 SKIN LESION: ICD-10-CM

## 2024-01-02 LAB
FLUAV RNA SPEC QL NAA+PROBE: NEGATIVE
FLUBV RNA SPEC QL NAA+PROBE: NEGATIVE
RSV RNA SPEC QL NAA+PROBE: NEGATIVE
SARS-COV-2 RNA RESP QL NAA+PROBE: POSITIVE

## 2024-01-02 PROCEDURE — 3078F DIAST BP <80 MM HG: CPT | Performed by: FAMILY MEDICINE

## 2024-01-02 PROCEDURE — 3075F SYST BP GE 130 - 139MM HG: CPT | Performed by: FAMILY MEDICINE

## 2024-01-02 PROCEDURE — 99214 OFFICE O/P EST MOD 30 MIN: CPT | Performed by: FAMILY MEDICINE

## 2024-01-02 PROCEDURE — 0241U POCT CEPHEID COV-2, FLU A/B, RSV - PCR: CPT | Performed by: FAMILY MEDICINE

## 2024-01-02 ASSESSMENT — PATIENT HEALTH QUESTIONNAIRE - PHQ9
6. FEELING BAD ABOUT YOURSELF - OR THAT YOU ARE A FAILURE OR HAVE LET YOURSELF OR YOUR FAMILY DOWN: NOT AL ALL
SUM OF ALL RESPONSES TO PHQ QUESTIONS 1-9: 0
2. FEELING DOWN, DEPRESSED, IRRITABLE, OR HOPELESS: NOT AT ALL
9. THOUGHTS THAT YOU WOULD BE BETTER OFF DEAD, OR OF HURTING YOURSELF: NOT AT ALL
4. FEELING TIRED OR HAVING LITTLE ENERGY: NOT AT ALL
8. MOVING OR SPEAKING SO SLOWLY THAT OTHER PEOPLE COULD HAVE NOTICED. OR THE OPPOSITE, BEING SO FIGETY OR RESTLESS THAT YOU HAVE BEEN MOVING AROUND A LOT MORE THAN USUAL: NOT AT ALL
1. LITTLE INTEREST OR PLEASURE IN DOING THINGS: NOT AT ALL
5. POOR APPETITE OR OVEREATING: NOT AT ALL
7. TROUBLE CONCENTRATING ON THINGS, SUCH AS READING THE NEWSPAPER OR WATCHING TELEVISION: NOT AT ALL
SUM OF ALL RESPONSES TO PHQ9 QUESTIONS 1 AND 2: 0
3. TROUBLE FALLING OR STAYING ASLEEP OR SLEEPING TOO MUCH: NOT AT ALL

## 2024-01-02 NOTE — PROGRESS NOTES
"CC: Flulike symptoms/chills    HPI:   Annamaria presents today with flulike symptoms(nasal congestion, dry cough, fatigability and body ache), she also reported intermittent fever/chills.  Denies any shortness of breath.  Has normal oxygen saturation.  Denies any sick contact.      Patient Active Problem List    Diagnosis Date Noted    Chronic anticoagulation 05/01/2020    S/P laparoscopic sleeve gastrectomy 02/19/2019    Mild episode of recurrent major depressive disorder (HCC) 02/19/2019    Impaired fasting glucose 02/19/2019    History of pulmonary embolus (PE) 04/30/2015       Current Outpatient Medications   Medication Sig Dispense Refill    buPROPion (WELLBUTRIN XL) 300 MG XL tablet Take 1 Tablet by mouth every morning. 90 Tablet 3    aspirin (ASA) 81 MG Chew Tab chewable tablet Chew 81 mg every day.       No current facility-administered medications for this visit.         Allergies as of 01/02/2024 - Reviewed 01/02/2024   Allergen Reaction Noted    Erythromycin  03/23/2011    Levaquin  04/22/2010    Tape  07/07/2008        ROS: Denies any chest pain, Shortness of breath, Changes bowel or bladder, Lower extremity edema.    Physical Exam:  /60 (BP Location: Right arm, Patient Position: Sitting, BP Cuff Size: Adult)   Pulse 84   Temp 37.2 °C (98.9 °F)   Resp 16   Ht 1.651 m (5' 5\")   SpO2 93%   BMI 27.46 kg/m²   Gen.: Well-developed, well-nourished, no apparent distress,pleasant and cooperative with the examination  Skin:  Warm and dry with good turgor.  Multiple scaly skin lesion on the face and the back  HEENT:Sinuses nontender with palpation, TMs clear, nasal congestion: However has patent nares, pink mucosa and clear rhinorrhea,no septal deviation ,polyps or lesions. lips without lesions, oropharynx clear.  Neck: Trachea midline,no masses or adenopathy. No JVD.  Cor: Regular rate and rhythm without murmur, gallop or rub.  Lungs: Respirations unlabored.Clear to auscultation with equal breath sounds " bilaterally. No wheezes, rhonchi.  Extremities: No cyanosis, clubbing or edema.        Assessment and Plan.   68 y.o. female     1. Flu-like symptoms  2. Cough, unspecified type  Patient is positive for COVID, negative for flu, and RSV  Recommend hydration and isolation  Recommend symptomatic treatment with Tylenol  Patient has a symptoms for only 2 days, so I will start patient on Paxlovid twice a day for 5 days.    - POCT Cepheid CoV-2, Flu A/B, RSV - PCR  - Nirmatrelvir&Ritonavir 300/100 20 x 150 MG & 10 x 100MG Tablet Therapy Pack; Take 300 mg nirmatrelvir (two 150 mg tablets) with 100 mg ritonavir (one 100 mg tablet) by mouth, with all three tablets taken together twice daily for 5 days.  Dispense: 30 Each; Refill: 0    3. Skin lesion  Multiple skin lesions on the face, probably seborrheic keratosis, however patient requested referral to dermatology.    - Referral to Dermatology

## 2024-01-11 PROCEDURE — RXMED WILLOW AMBULATORY MEDICATION CHARGE: Performed by: STUDENT IN AN ORGANIZED HEALTH CARE EDUCATION/TRAINING PROGRAM

## 2024-01-12 ENCOUNTER — PHARMACY VISIT (OUTPATIENT)
Dept: PHARMACY | Facility: MEDICAL CENTER | Age: 69
End: 2024-01-12
Payer: COMMERCIAL

## 2024-02-20 ENCOUNTER — TELEPHONE (OUTPATIENT)
Dept: HEALTH INFORMATION MANAGEMENT | Facility: OTHER | Age: 69
End: 2024-02-20
Payer: MEDICARE

## 2024-04-11 PROCEDURE — RXMED WILLOW AMBULATORY MEDICATION CHARGE: Performed by: STUDENT IN AN ORGANIZED HEALTH CARE EDUCATION/TRAINING PROGRAM

## 2024-04-12 ENCOUNTER — PHARMACY VISIT (OUTPATIENT)
Dept: PHARMACY | Facility: MEDICAL CENTER | Age: 69
End: 2024-04-12
Payer: COMMERCIAL

## 2024-07-11 PROCEDURE — RXMED WILLOW AMBULATORY MEDICATION CHARGE: Performed by: STUDENT IN AN ORGANIZED HEALTH CARE EDUCATION/TRAINING PROGRAM

## 2024-07-12 ENCOUNTER — PHARMACY VISIT (OUTPATIENT)
Dept: PHARMACY | Facility: MEDICAL CENTER | Age: 69
End: 2024-07-12
Payer: COMMERCIAL

## 2024-08-21 ENCOUNTER — PHARMACY VISIT (OUTPATIENT)
Dept: PHARMACY | Facility: MEDICAL CENTER | Age: 69
End: 2024-08-21
Payer: COMMERCIAL

## 2024-08-21 ENCOUNTER — OFFICE VISIT (OUTPATIENT)
Dept: URGENT CARE | Facility: CLINIC | Age: 69
End: 2024-08-21
Payer: MEDICARE

## 2024-08-21 VITALS
OXYGEN SATURATION: 98 % | SYSTOLIC BLOOD PRESSURE: 118 MMHG | TEMPERATURE: 97.9 F | DIASTOLIC BLOOD PRESSURE: 58 MMHG | HEART RATE: 85 BPM | HEIGHT: 65 IN | WEIGHT: 179.8 LBS | BODY MASS INDEX: 29.96 KG/M2

## 2024-08-21 DIAGNOSIS — K04.7 DENTAL INFECTION: ICD-10-CM

## 2024-08-21 PROCEDURE — 3078F DIAST BP <80 MM HG: CPT

## 2024-08-21 PROCEDURE — 3074F SYST BP LT 130 MM HG: CPT

## 2024-08-21 PROCEDURE — RXMED WILLOW AMBULATORY MEDICATION CHARGE

## 2024-08-21 PROCEDURE — 99213 OFFICE O/P EST LOW 20 MIN: CPT

## 2024-08-21 RX ORDER — IBUPROFEN 600 MG/1
600 TABLET, FILM COATED ORAL EVERY 6 HOURS PRN
Qty: 30 TABLET | Refills: 0 | Status: SHIPPED | OUTPATIENT
Start: 2024-08-21

## 2024-08-21 RX ORDER — ACETAMINOPHEN 500 MG
500-1000 TABLET ORAL EVERY 6 HOURS PRN
Qty: 30 TABLET | Refills: 0 | Status: SHIPPED | OUTPATIENT
Start: 2024-08-21

## 2024-08-21 ASSESSMENT — ENCOUNTER SYMPTOMS: FEVER: 0

## 2024-08-21 NOTE — PROGRESS NOTES
"Verbal consent was acquired by the patient to use [employer] ambient listening note generation during this visit   Subjective:   Annamaria Jorgensen is a 69 y.o. female who presents for Jaw Pain (\"Intense throbbing in my jaw.\" Teeth are shifting. Onset 08/17/2024. Radiates towards nose. )      HPI:  History of Present Illness  The patient is a 69-year-old female who presents today for dental pain.    She reports experiencing right sided right sided jaw pain.  She reports that her symptoms developed 3 days ago.  She describes the pain as electric-like, even upon light touch.  She also reports noticing some right sided facial swelling.  The pain intensifies when she chews, leading her to favor one side.   She has been managing the pain with Aleve, which provides some relief, but she does not use it frequently. She acknowledges that her oral health is poor and requests a stronger pain medication, as she feels her current regimen is insufficient.          Review of Systems   Constitutional:  Negative for fever.   HENT:  Positive for congestion.         + Right-sided dental pain       Medications:    Current Outpatient Medications on File Prior to Visit   Medication Sig Dispense Refill    buPROPion (WELLBUTRIN XL) 300 MG XL tablet Take 1 Tablet by mouth every morning. 90 Tablet 3    aspirin (ASA) 81 MG Chew Tab chewable tablet Chew 81 mg every day.      Nirmatrelvir&Ritonavir 300/100 20 x 150 MG & 10 x 100MG Tablet Therapy Pack Take 300 mg nirmatrelvir (two 150 mg tablets) with 100 mg ritonavir (one 100 mg tablet) by mouth, with all three tablets taken together twice daily for 5 days. (Patient not taking: Reported on 8/21/2024) 30 Each 0     No current facility-administered medications on file prior to visit.        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 at [facility] ORS    SHOULDER DECOMPRESSION ARTHROSCOPIC  7/8/08    Performed by JOHN LAMA at [facility] ORS    ROTATOR CUFF REPAIR  7/8/08    Performed by JOHN LAMA at [facility] ORS    CHOLECYSTECTOMY  2001    TONSILLECTOMY AND ADENOIDECTOMY  1963       Past Social Hx:   Social History     Tobacco Use    Smoking status: Never    Smokeless tobacco: Never   Vaping Use    Vaping status: Never Used   Substance Use Topics    Alcohol use: Yes     Alcohol/week: 1.2 oz     Types: 2 Shots of liquor per week    Drug use: No          Problem list, medications, and allergies reviewed by myself today in Epic.     Objective:     /58 (BP Location: Left arm, Patient Position: Sitting)   Pulse 85   Temp 36.6 °C (97.9 °F) (Temporal)   Ht 1.651 m (5' 5\")   Wt 81.6 kg (179 lb 12.8 oz)   SpO2 98%   BMI 29.92 kg/m²     Physical Exam  Vitals and nursing note reviewed.   Constitutional:       General: She is not in acute distress.     Appearance: Normal appearance. She is normal weight. She is not ill-appearing, toxic-appearing or diaphoretic.   HENT:      Head: Normocephalic and atraumatic.      Nose: Congestion present.      Right Sinus: Maxillary sinus tenderness present.      Mouth/Throat:      Mouth: Mucous membranes are moist.      Dentition: Dental tenderness present.      Pharynx: Oropharynx is clear. No oropharyngeal exudate or posterior oropharyngeal erythema.        Comments: + Broken tooth, + gingival swelling, + right sided swelling  Cardiovascular:      Rate and Rhythm: Normal rate and regular rhythm.      Pulses: Normal pulses.      Heart sounds: Normal heart sounds. No murmur heard.     No friction rub. No gallop.   Pulmonary: "      Effort: Pulmonary effort is normal. No respiratory distress.      Breath sounds: Normal breath sounds. No stridor. No wheezing, rhonchi or rales.   Chest:      Chest wall: No tenderness.   Musculoskeletal:      Cervical back: Neck supple. No tenderness.   Lymphadenopathy:      Cervical: No cervical adenopathy.   Skin:     General: Skin is warm and dry.      Capillary Refill: Capillary refill takes less than 2 seconds.   Neurological:      General: No focal deficit present.      Mental Status: She is alert and oriented to person, place, and time. Mental status is at baseline.      Cranial Nerves: No cranial nerve deficit.      Motor: No weakness.      Gait: Gait normal.   Psychiatric:         Mood and Affect: Mood normal.         Behavior: Behavior normal.         Thought Content: Thought content normal.         Judgment: Judgment normal.         Assessment/Plan:     Diagnosis and associated orders:   1. Dental infection  - amoxicillin-clavulanate (AUGMENTIN) 875-125 MG Tab; Take 1 Tablet by mouth 2 times a day for 7 days.  Dispense: 14 Tablet; Refill: 0  - ibuprofen (MOTRIN) 600 MG Tab; Take 1 Tablet by mouth every 6 hours as needed for Moderate Pain.  Dispense: 30 Tablet; Refill: 0  - acetaminophen (TYLENOL) 500 MG Tab; Take 1-2 Tablets by mouth every 6 hours as needed for Moderate Pain (Do not exceed 10 tabs/24hrs).  Dispense: 30 Tablet; Refill: 0        Comments/MDM:   Pt is clinically stable at today's acute urgent care visit.  No acute distress noted. Appropriate for outpatient management at this time.     Assessment & Plan  1. Dental infection  Physical exam findings is consistent with dental infection.  A prescription for Augmentin 875 mg to be taken twice daily for a week is provided. She is advised to take ibuprofen Additionally, Tylenol will be prescribed for pain control. She is instructed to maintain routine oral hygiene and schedule a follow-up appointment with a dentist.    2. Sinus  Infection.  She reports pain in the maxillary sinus and nasal congestion, which started around the same time as her dental pain. Augmentin 875 mg, prescribed              Discussed DDx, management options (risks,benefits, and alternatives to planned treatment), natural progression and supportive care.  Expressed understanding and the treatment plan was agreed upon. Questions were encouraged and answered   Return to urgent care prn if new or worsening sx or if there is no improvement in condition prn.    Educated in Red flags and indications to immediately call 911 or present to the Emergency Department.   Advised the patient to follow-up with the primary care physician for recheck, reevaluation, and consideration of further management.    I personally reviewed prior external notes and test results pertinent to today's visit.  I have independently reviewed and interpreted all diagnostics ordered during this urgent care acute visit.         Please note that this dictation was created using voice recognition software. I have made a reasonable attempt to correct obvious errors, but I expect that there are errors of grammar and possibly content that I did not discover before finalizing the note.    This note was electronically signed by FEDE Clayton

## 2024-10-11 DIAGNOSIS — F33.0 MILD EPISODE OF RECURRENT MAJOR DEPRESSIVE DISORDER (HCC): ICD-10-CM

## 2024-10-11 RX ORDER — BUPROPION HYDROCHLORIDE 300 MG/1
300 TABLET ORAL EVERY MORNING
Qty: 90 TABLET | Refills: 3 | OUTPATIENT
Start: 2024-10-11

## 2024-11-25 ENCOUNTER — TELEPHONE (OUTPATIENT)
Dept: HEALTH INFORMATION MANAGEMENT | Facility: OTHER | Age: 69
End: 2024-11-25
Payer: MEDICARE

## 2024-12-12 ENCOUNTER — TELEPHONE (OUTPATIENT)
Dept: HEALTH INFORMATION MANAGEMENT | Facility: OTHER | Age: 69
End: 2024-12-12
Payer: MEDICARE

## 2025-02-27 ENCOUNTER — HOSPITAL ENCOUNTER (OUTPATIENT)
Facility: MEDICAL CENTER | Age: 70
End: 2025-02-27
Payer: MEDICARE

## 2025-02-27 ENCOUNTER — OFFICE VISIT (OUTPATIENT)
Dept: URGENT CARE | Facility: CLINIC | Age: 70
End: 2025-02-27
Payer: MEDICARE

## 2025-02-27 ENCOUNTER — PHARMACY VISIT (OUTPATIENT)
Dept: PHARMACY | Facility: MEDICAL CENTER | Age: 70
End: 2025-02-27
Payer: COMMERCIAL

## 2025-02-27 VITALS
DIASTOLIC BLOOD PRESSURE: 76 MMHG | RESPIRATION RATE: 18 BRPM | TEMPERATURE: 97.6 F | HEIGHT: 65 IN | OXYGEN SATURATION: 97 % | HEART RATE: 67 BPM | WEIGHT: 191 LBS | BODY MASS INDEX: 31.82 KG/M2 | SYSTOLIC BLOOD PRESSURE: 114 MMHG

## 2025-02-27 DIAGNOSIS — R39.9 UTI SYMPTOMS: ICD-10-CM

## 2025-02-27 LAB
APPEARANCE UR: CLEAR
BILIRUB UR STRIP-MCNC: NEGATIVE MG/DL
COLOR UR AUTO: YELLOW
GLUCOSE UR STRIP.AUTO-MCNC: NEGATIVE MG/DL
KETONES UR STRIP.AUTO-MCNC: NEGATIVE MG/DL
LEUKOCYTE ESTERASE UR QL STRIP.AUTO: NORMAL
NITRITE UR QL STRIP.AUTO: NEGATIVE
PH UR STRIP.AUTO: 5.5 [PH] (ref 5–8)
PROT UR QL STRIP: NEGATIVE MG/DL
RBC UR QL AUTO: NEGATIVE
SP GR UR STRIP.AUTO: 1.01
UROBILINOGEN UR STRIP-MCNC: NORMAL MG/DL

## 2025-02-27 PROCEDURE — RXMED WILLOW AMBULATORY MEDICATION CHARGE

## 2025-02-27 PROCEDURE — 87086 URINE CULTURE/COLONY COUNT: CPT

## 2025-02-27 RX ORDER — NITROFURANTOIN 25; 75 MG/1; MG/1
100 CAPSULE ORAL 2 TIMES DAILY
Qty: 10 CAPSULE | Refills: 0 | Status: SHIPPED | OUTPATIENT
Start: 2025-02-27 | End: 2025-03-04

## 2025-02-27 RX ORDER — PHENAZOPYRIDINE HYDROCHLORIDE 200 MG/1
TABLET, FILM COATED ORAL
Qty: 6 TABLET | Refills: 0 | Status: SHIPPED | OUTPATIENT
Start: 2025-02-27

## 2025-02-27 ASSESSMENT — ENCOUNTER SYMPTOMS
NAUSEA: 0
RESPIRATORY NEGATIVE: 1
DIAPHORESIS: 0
DIARRHEA: 0
EYES NEGATIVE: 1
CARDIOVASCULAR NEGATIVE: 1
ABDOMINAL PAIN: 1
CHILLS: 0
FLANK PAIN: 0
VOMITING: 0
FEVER: 0
WEAKNESS: 0
PSYCHIATRIC NEGATIVE: 1
MYALGIAS: 0

## 2025-02-28 NOTE — PROGRESS NOTES
"Subjective:   Annamaria Jorgensen is a 69 y.o. female who presents for Abdominal Pain (X3 days bleeding in urethra, and feels \" fullness\" in lower abdomin.)      HPI  Patient complains of suprapubic pressure     Negative: Dysuria, urinary urgency, urinary frequency, vaginal discharge, suprapubic pain, general malaise, hematuria, recent urinary procedure, flank pain, fever, chills, nausea, vomiting      Last UTI: 1/2023  Last antibiotic: none in the last 90 days      Review of Systems   Constitutional:  Negative for chills, diaphoresis, fever and malaise/fatigue.   HENT: Negative.     Eyes: Negative.    Respiratory: Negative.     Cardiovascular: Negative.    Gastrointestinal:  Positive for abdominal pain. Negative for diarrhea, nausea and vomiting.   Genitourinary:  Negative for dysuria, flank pain, frequency, hematuria and urgency.   Musculoskeletal:  Negative for myalgias.   Skin: Negative.    Neurological:  Negative for weakness.   Endo/Heme/Allergies: Negative.    Psychiatric/Behavioral: Negative.     All other systems reviewed and are negative.      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    Carpal tunnel syndrome on both sides     With corrective sx in 1992 and 1993    CATARACT     Depression     Fibromyalgia     Frequent UTI     Heart valve disease     Hiatus hernia syndrome     Indigestion     Jaundice     After gallbladder was removed    Pain     fibromyalgia; scoliosis    Palpitations     Pulmonary embolism (HCC)     Pulmonary embolism (HCC) 4/30/2015    S/P tonsillectomy     Scoliosis     Sleep apnea     Uses CPAP    Unspecified hemorrhagic conditions     on coumadin    Unspecified urinary incontinence     Urinary bladder disorder        Allergies:  Allergies   Allergen Reactions    Erythromycin  " "   Levaquin     Tape      rash       Social history, surgical history, medications, and current problem list reviewed today in Epic.       Objective:     /76   Pulse 67   Temp 36.4 °C (97.6 °F) (Temporal)   Resp 18   Ht 1.651 m (5' 5\")   Wt 86.6 kg (191 lb)   SpO2 97%     Physical Exam  Vitals reviewed.   Constitutional:       General: She is not in acute distress.     Appearance: Normal appearance. She is not ill-appearing, toxic-appearing or diaphoretic.   Cardiovascular:      Rate and Rhythm: Normal rate.      Pulses: Normal pulses.   Pulmonary:      Effort: Pulmonary effort is normal.   Abdominal:      General: Abdomen is flat.      Palpations: Abdomen is soft.      Tenderness: There is abdominal tenderness. There is no right CVA tenderness or left CVA tenderness.   Musculoskeletal:         General: Normal range of motion.      Cervical back: Normal range of motion.   Skin:     General: Skin is warm.      Capillary Refill: Capillary refill takes less than 2 seconds.   Neurological:      General: No focal deficit present.      Mental Status: She is alert and oriented to person, place, and time.   Psychiatric:         Mood and Affect: Mood normal.         Behavior: Behavior normal.         Assessment/Plan:       Diagnosis and associated orders:     1. UTI symptoms  - POCT Urinalysis  - URINE CULTURE(NEW); Future  - nitrofurantoin (MACROBID) 100 MG Cap; Take 1 Capsule by mouth 2 times a day for 5 days.  Dispense: 10 Capsule; Refill: 0  - phenazopyridine (PYRIDIUM) 200 MG Tab; Take 1 tab by mouth up to three times per day only if needed for bladder or urinary pain.  Will turn urine orange.  Dispense: 6 Tablet; Refill: 0     Comments/MDM:   I personally reviewed prior external notes and test results pertinent to today's visit as well as additional imaging and testing completed in clinic today.     Very pleasant 69-year-old female presenting with complaints of uti symptoms. In clinic UA was positive for " trace leukocytes. Urine culture sent to lab.  Patient started on Macrobid as well as pyridium.  No concern for systemic infection or kidney involvement.  Patient is clinically stable at today's acute urgent care visit. Vital signs are normal and reassuring.  No acute distress noted. Appropriate for outpatient management at this time. No red flag warnings noted.  Patient given strict instructions to follow up with emergency room if they develop any red flag warnings which were discussed in depth.  They verbalized understanding.    Differential diagnosis, natural history, supportive care, and indications for immediate follow-up discussed. All questions answered. They agree with the plan of care.      Please note that this dictation was created using voice recognition software. I have made every reasonable attempt to correct obvious errors, but I expect that there are errors of grammar and possibly content that I did not discover before finalizing the note.

## 2025-02-28 NOTE — PATIENT INSTRUCTIONS
· If you were prescribed antibiotics, take them exactly instructed. Finish the medication even if you feel better after you have only taken some of the medication.  · Drink enough water and fluids to keep your urine clear or pale yellow.  · Avoid caffeine, tea, and carbonated beverages. They tend to irritate your bladder.  · Empty your bladder often. Avoid holding urine for long periods of time.  · Empty your bladder before and after sexual intercourse.  · After a bowel movement, women should cleanse from front to back. Use each tissue only once.

## 2025-03-02 LAB
BACTERIA UR CULT: NORMAL
SIGNIFICANT IND 70042: NORMAL
SITE SITE: NORMAL
SOURCE SOURCE: NORMAL

## 2025-07-18 ENCOUNTER — TELEPHONE (OUTPATIENT)
Dept: HEALTH INFORMATION MANAGEMENT | Facility: OTHER | Age: 70
End: 2025-07-18
Payer: MEDICARE